# Patient Record
Sex: FEMALE | URBAN - METROPOLITAN AREA
[De-identification: names, ages, dates, MRNs, and addresses within clinical notes are randomized per-mention and may not be internally consistent; named-entity substitution may affect disease eponyms.]

---

## 2024-05-07 PROCEDURE — 0753T DGTZ GLS MCRSCP SLD LEVEL IV: CPT

## 2024-05-07 PROCEDURE — 88305 TISSUE EXAM BY PATHOLOGIST: CPT | Performed by: PATHOLOGY

## 2024-05-07 PROCEDURE — 88305 TISSUE EXAM BY PATHOLOGIST: CPT

## 2024-05-08 ENCOUNTER — LAB REQUISITION (OUTPATIENT)
Dept: LAB | Facility: HOSPITAL | Age: 69
End: 2024-05-08

## 2024-05-08 DIAGNOSIS — Z86.010 PERSONAL HISTORY OF COLONIC POLYPS: ICD-10-CM

## 2024-05-15 LAB
LABORATORY COMMENT REPORT: NORMAL
PATH REPORT.FINAL DX SPEC: NORMAL
PATH REPORT.GROSS SPEC: NORMAL
PATH REPORT.MICROSCOPIC SPEC OTHER STN: NORMAL
PATH REPORT.RELEVANT HX SPEC: NORMAL
PATH REPORT.TOTAL CANCER: NORMAL

## 2024-08-05 ENCOUNTER — APPOINTMENT (OUTPATIENT)
Dept: SURGERY | Facility: CLINIC | Age: 69
End: 2024-08-05
Payer: MEDICARE

## 2024-08-05 VITALS
HEIGHT: 68 IN | HEART RATE: 72 BPM | SYSTOLIC BLOOD PRESSURE: 154 MMHG | BODY MASS INDEX: 31.83 KG/M2 | WEIGHT: 210 LBS | DIASTOLIC BLOOD PRESSURE: 81 MMHG

## 2024-08-05 DIAGNOSIS — E27.8 ADRENAL MASS (MULTI): Primary | ICD-10-CM

## 2024-08-05 PROCEDURE — 99205 OFFICE O/P NEW HI 60 MIN: CPT | Performed by: SURGERY

## 2024-08-05 PROCEDURE — 1159F MED LIST DOCD IN RCRD: CPT | Performed by: SURGERY

## 2024-08-05 PROCEDURE — 1160F RVW MEDS BY RX/DR IN RCRD: CPT | Performed by: SURGERY

## 2024-08-05 PROCEDURE — 3008F BODY MASS INDEX DOCD: CPT | Performed by: SURGERY

## 2024-08-05 RX ORDER — TRIAMCINOLONE ACETONIDE 1 MG/ML
LOTION TOPICAL
COMMUNITY
Start: 2023-09-19

## 2024-08-05 RX ORDER — AMLODIPINE BESYLATE 5 MG/1
TABLET ORAL
COMMUNITY
Start: 2024-06-18

## 2024-08-05 RX ORDER — ALBUTEROL SULFATE 90 UG/1
INHALANT RESPIRATORY (INHALATION)
COMMUNITY
Start: 2024-03-05

## 2024-08-05 RX ORDER — VALSARTAN AND HYDROCHLOROTHIAZIDE 160; 25 MG/1; MG/1
TABLET ORAL
COMMUNITY
Start: 2024-06-18

## 2024-08-05 RX ORDER — BUDESONIDE AND FORMOTEROL FUMARATE DIHYDRATE 160; 4.5 UG/1; UG/1
AEROSOL RESPIRATORY (INHALATION)
COMMUNITY
Start: 2024-06-05

## 2024-08-05 RX ORDER — ERGOCALCIFEROL 1.25 MG/1
CAPSULE ORAL
COMMUNITY
Start: 2023-11-30

## 2024-08-05 RX ORDER — HYDROCORTISONE AND ACETIC ACID 20.75; 10.375 MG/ML; MG/ML
SOLUTION AURICULAR (OTIC)
COMMUNITY
Start: 2023-09-19

## 2024-08-05 RX ORDER — CLOTRIMAZOLE AND BETAMETHASONE DIPROPIONATE 10; .64 MG/G; MG/G
CREAM TOPICAL
COMMUNITY
Start: 2024-03-07

## 2024-08-05 RX ORDER — BETAMETHASONE DIPROPIONATE 0.5 MG/G
CREAM TOPICAL
COMMUNITY
Start: 2023-09-19

## 2024-08-05 ASSESSMENT — ENCOUNTER SYMPTOMS
MUSCULOSKELETAL NEGATIVE: 1
CARDIOVASCULAR NEGATIVE: 1
NEUROLOGICAL NEGATIVE: 1
PSYCHIATRIC NEGATIVE: 1
CONSTITUTIONAL NEGATIVE: 1
RESPIRATORY NEGATIVE: 1
EYES NEGATIVE: 1
ENDOCRINE NEGATIVE: 1

## 2024-08-05 NOTE — PROGRESS NOTES
Subjective   Patient ID: Noemí Reece is a 69 y.o. female who presents for surgical consultation for incidentally discovered adrenal mass.    HPI Mrs. Reece had a CT scan of the chest as a lung screening protocol.  During that study on the lower cuts she was found to have a 4.5 cm mass sitting just below the liver that they felt could be coming from the right adrenal gland or an exophytic liver lesion.  Therefore she was sent for an MRI of the abdomen on July 17, 2024.  This was done in the [facility].  This was read as a likely 6.3 cm right-sided adrenal mass.  These images are not available for my review today.  We will request that they get pushed over for further imaging.  If the MRI of his poor quality we may need to request a CT scan adrenal protocol to be done within the  system.  This may be necessary for accurate surgical planning.    Patient then met with Dr. Wheeler of medical endocrinology at [facility].  Dr. Wheeler did a biochemical workup on this.  24-hour catecholamines were negative for pheochromocytoma.  Urinary cortisol levels were normal.  Plasma cortisol was 8.8.  After dexamethasone suppression testing cortisol only dropped to 6.2.  ACTH level was low normal at 4.4.  Therefore the patient may have some subclinical Cushing's.  There is also mention in the note the patient was being sent for renin and aldosterone levels.  I saw a normal aldosterone level of 7.9 but I do not see a corresponding renin level.  However with that level of aldosterone it is less likely the patient has primary hyperaldosteronism.    She does have well-controlled hypertension on 2 drug therapy.    Patient stated that after the MRI was done at the [facility] that one of the radiologist reviewed old imaging on her and told her that she may have had an adrenal mass present for almost 20 years and it looks like it has been slowly growing over time.  She thinks some of these images may have been done at  [facility] which would be a [facility] affiliate.  Therefore we will also be requesting those images.  That could be very helpful to determine the history of this mass and the timeframe over which it grew.  I told her that slow-growing masses over time would be less likely to be something malignant.    She denies any real abdominal pain.  Perhaps some occasional right flank pain.  This is very intermittent.  No troubles with constipation no difficulty passing urine no blood in her urine or stool.  She said that over the last 3 years she has been trying to lose some weight and she lost about 20 pounds since she retired.  This was intentional.  Appetite remains good.    Family history-no adrenal disorders no adrenal cancer.  She said there is a history of colon cancer and breast cancer in her family.    Review of Systems   Constitutional: Negative.    HENT: Negative.     Eyes: Negative.    Respiratory: Negative.     Cardiovascular: Negative.    Endocrine: Negative.    Genitourinary: Negative.    Musculoskeletal: Negative.    Neurological: Negative.    Psychiatric/Behavioral: Negative.         Objective   Physical Exam  Vitals reviewed.   Constitutional:       Appearance: Normal appearance.   Eyes:      General: No scleral icterus.  Neck:      Vascular: No carotid bruit.      Comments: Thyroid gland feels normal.  No palpable masses  Cardiovascular:      Rate and Rhythm: Normal rate and regular rhythm.      Heart sounds: Normal heart sounds.   Pulmonary:      Effort: Pulmonary effort is normal.      Breath sounds: Normal breath sounds.   Abdominal:      General: Abdomen is flat. There is no distension.      Palpations: Abdomen is soft. There is no mass.      Tenderness: There is no right CVA tenderness or left CVA tenderness.   Musculoskeletal:         General: Normal range of motion.   Lymphadenopathy:      Cervical: No cervical adenopathy.   Skin:     General: Skin is warm and dry.      Coloration:  Skin is not jaundiced.   Neurological:      General: No focal deficit present.      Mental Status: She is alert and oriented to person, place, and time.   Psychiatric:         Mood and Affect: Mood normal.         Behavior: Behavior normal.         Assessment/Plan    Mrs. Reece had incidental discovery of an adrenal mass during a recent lung screening CT scan.  This was followed up by an MRI showing a 6.3 cm right adrenal mass.  Patient however told me that when the radiologist did some further review of old imaging on her that she was told that she had this adrenal mass dating back almost 20 years.  She thinks most of these may have been done with the [facility] system at [facility].  Therefore we will request some of her old imaging to see if we can get a timetable on how long the mass has been present and any sort of growth rate.    Masses not causing any abdominal pain for her.  No other functional symptoms.    She does have well-controlled hypertension.  She did not completely suppressed on a dexamethasone suppression test done by her endocrinologist.  Her workup was however negative for pheochromocytoma and for hyperaldosteronism.    I told her and her  that in general for adrenal masses greater than 6 cm we would recommend resection.  We will get the images of her MRI transferred to us so I can review those.  Will also try to get any old radiology records to look at a growth pattern for this.    Based on that, I told her we can then work on a timetable for surgery.  If it looks like this has been growing slowly and the MRI appears to have smooth borders, I would be willing to approach this laparoscopically for her.  If the MRI imaging is inadequate, we may request an adrenal protocol CT scan within the [place] system.  She said that she be willing to go to either [facility] or the [facility] to have the CT scan done.    I will call her as soon as  we get the MRI images for me to personally review.    She and her  are comfortable with this plan.         Cyrus Ervin MD 08/05/24 3:50 PM

## 2024-08-12 ENCOUNTER — DOCUMENTATION (OUTPATIENT)
Dept: SURGERY | Facility: CLINIC | Age: 69
End: 2024-08-12
Payer: MEDICARE

## 2024-08-12 NOTE — PROGRESS NOTES
Medical records request sent to [facility] for previous reports of CT/MRI abdomen/ pelvis. Written correspondence faxed back stating no records for these tests located.

## 2024-08-14 ENCOUNTER — TELEPHONE (OUTPATIENT)
Dept: SURGERY | Facility: CLINIC | Age: 69
End: 2024-08-14
Payer: MEDICARE

## 2024-08-14 DIAGNOSIS — E27.8 ADRENAL MASS (MULTI): ICD-10-CM

## 2024-08-14 NOTE — TELEPHONE ENCOUNTER
Call to patient. No answer. Left voicemail message notifying her that because the images from her MRI abd were inadequate, Dr. Ervin has ordered an adrenal CT and requests it be completed at a  facility. Patient instructed to go for creat lab prior to test per protocol. Patient also notified that a medical records request was sent to [facility] for previous CT/MRI imaging of the mass and no records were located. Patient asked to look through any old records available to her for dates/locations where we could continue to look for old imaging for comparison. Callback requested and number provided.

## 2024-08-19 ENCOUNTER — TELEPHONE (OUTPATIENT)
Dept: SURGERY | Facility: CLINIC | Age: 69
End: 2024-08-19
Payer: MEDICARE

## 2024-08-19 NOTE — TELEPHONE ENCOUNTER
Return call from patient who states that she no longer has any previous imaging reports available but is certain that the only previous CT scan done was of her shoulder. Patient denies previous CT/MRI of abdomen/pelvis/adrenals. Patient verifies she has appointment to get CT adrenal scan done 9/4/24. Dr. Ervin updated.

## 2024-08-26 ENCOUNTER — LAB (OUTPATIENT)
Dept: LAB | Facility: LAB | Age: 69
End: 2024-08-26
Payer: MEDICARE

## 2024-08-26 DIAGNOSIS — E27.8 ADRENAL MASS (MULTI): ICD-10-CM

## 2024-08-26 LAB
CREAT SERPL-MCNC: 0.9 MG/DL (ref 0.5–1.05)
EGFRCR SERPLBLD CKD-EPI 2021: 69 ML/MIN/1.73M*2

## 2024-08-26 PROCEDURE — 82565 ASSAY OF CREATININE: CPT

## 2024-08-26 PROCEDURE — 36415 COLL VENOUS BLD VENIPUNCTURE: CPT

## 2024-09-04 ENCOUNTER — HOSPITAL ENCOUNTER (OUTPATIENT)
Dept: RADIOLOGY | Facility: HOSPITAL | Age: 69
Discharge: HOME | End: 2024-09-04
Payer: MEDICARE

## 2024-09-04 DIAGNOSIS — E27.8 ADRENAL MASS (MULTI): ICD-10-CM

## 2024-09-04 PROCEDURE — 2550000001 HC RX 255 CONTRASTS: Performed by: SURGERY

## 2024-09-04 PROCEDURE — 74170 CT ABD WO CNTRST FLWD CNTRST: CPT | Performed by: STUDENT IN AN ORGANIZED HEALTH CARE EDUCATION/TRAINING PROGRAM

## 2024-09-04 PROCEDURE — 74170 CT ABD WO CNTRST FLWD CNTRST: CPT

## 2024-09-09 ENCOUNTER — TELEPHONE (OUTPATIENT)
Dept: SURGICAL ONCOLOGY | Facility: HOSPITAL | Age: 69
End: 2024-09-09
Payer: MEDICARE

## 2024-09-09 NOTE — TELEPHONE ENCOUNTER
Result Communication-patient that her imaging looks most consistent with a benign adrenal nodule.  It does look amenable to laparoscopic resection.    I will have my office staff contact her to select a surgical date in November or December that is convenient for her schedule.    Resulted Orders   CT adrenals w and wo IV contrast    Narrative    Interpreted By:  Piyush Kirk,   STUDY:  CT ADRENALS W AND WO IV CONTRAST;  9/4/2024 12:40 pm      INDICATION:  Signs/Symptoms:adrenal mass.      ,E27.8 Other specified disorders of adrenal gland (Multi)      COMPARISON:  Abdomen MRI 05/20/2024      ACCESSION NUMBER(S):  [id number]      ORDERING CLINICIAN:  BEE BONNER      TECHNIQUE:  CT of the abdomen was performed.  Contiguous axial images were  obtained at 3 mm slice thickness through the abdomen. Coronal and  sagittal reconstructions at 3 mm slice thickness were performed.   75  ML of Omnipaque 350 was administered intravenously without immediate  complication.      FINDINGS:  LOWER CHEST:  Bibasal atelectasis.      ABDOMEN:      LIVER:  Normal size. Normal contour. Normal density. Scattered simple cysts.      BILE DUCTS:  The bile ducts are not dilated.      GALLBLADDER:  Gallbladder: No calcified stones. No wall thickening.      PANCREAS:  The pancreas appears unremarkable.      SPLEEN:  Within normal limits.      ADRENAL GLANDS:  No left adrenal nodules.      Right adrenal nodule measuring 6.2 cm with the areas of macroscopic  fat in tiny foci of calcification. Precontrast average Hounsfield  unit of 8, enhanced CT Hounsfield unit number of 41 and delayed phase  Hounsfield unit number of 16 : Absolute washout: 76%  Relative washout : 61%      KIDNEYS AND URETERS:  The kidneys have normal size and enhance symmetrically.  No  hydroureteronephrosis or nephroureterolithiasis is present.      BOWEL:  No bowel dilatation. Mild colonic stool burden. No ascites or  pneumoperitoneum.      VESSELS:  Multifocal vascular  calcifications and atherosclerotic changes      PERITONEUM/RETROPERITONEUM/LYMPH NODES:  No enlarged lymph nodes      ABDOMINAL WALL:  The abdominal wall soft tissues appear normal.      BONES:  Multilevel degenerative spine changes and facet joint disease.  Benign-looking sclerotic focus in L4 vertebral body.        Impression    1. Right adrenal nodule measuring 6.2 cm with absolute and relative  washout favoring adenoma, alternatively there are areas of possible  macroscopic fat which also could be seen with myelolipoma. Few flecks  of calcifications noted which could be seen in myelolipoma.  Alternative and remote possibility of rare dermoid cyst of the  adrenal gland given morphology. Old films if available for  comparison, would be of great value for documenting stability and  predicting benign nature of lesion. To note, given the size of this  lesion, this observation would be indeterminate and warrant further  workup as adrenocortical carcinoma (although felt less likely) could  not be entirely excluded without comparison.  2. Few scattered simple hepatic cysts.      MACRO:  None      Signed by: Piyush Kirk 9/6/2024 1:25 PM  Dictation workstation:   [id number]       12:05 PM      Results were successfully communicated with the patient and they acknowledged their understanding.

## 2024-11-06 DIAGNOSIS — R23.3 EASY BRUISING: ICD-10-CM

## 2024-11-06 DIAGNOSIS — Z00.00 HEALTHCARE MAINTENANCE: ICD-10-CM

## 2024-11-06 DIAGNOSIS — Z01.818 PREOPERATIVE TESTING: ICD-10-CM

## 2024-11-06 DIAGNOSIS — E27.8 ADRENAL MASS (MULTI): ICD-10-CM

## 2024-11-15 ENCOUNTER — PREP FOR PROCEDURE (OUTPATIENT)
Dept: SURGICAL ONCOLOGY | Facility: HOSPITAL | Age: 69
End: 2024-11-15
Payer: MEDICARE

## 2024-11-15 DIAGNOSIS — E27.8 ADRENAL MASS GREATER THAN 4 CM IN DIAMETER (MULTI): Primary | ICD-10-CM

## 2024-11-15 NOTE — H&P
Noemí Reece is a 69 y.o. female who  had a CT scan of the chest as a lung screening protocol.  During that study on the lower cuts she was found to have a 4.5 cm mass sitting just below the liver that they felt could be coming from the right adrenal gland or an exophytic liver lesion.  Therefore she was sent for an MRI of the abdomen on July 17, 2024.  This was done in the [facility] system.  This was read as a likely 6.3 cm right-sided adrenal mass.  These images are not available for my review today.  We will request that they get pushed over for further imaging.  If the MRI of his poor quality we may need to request a CT scan adrenal protocol to be done within the  system.  This may be necessary for accurate surgical planning.     Patient then met with Dr. Wheeler of medical endocrinology at [facility].  Dr. Wheeler did a biochemical workup on this.  24-hour catecholamines were negative for pheochromocytoma.  Urinary cortisol levels were normal.  Plasma cortisol was 8.8.  After dexamethasone suppression testing cortisol only dropped to 6.2.  ACTH level was low normal at 4.4.  Therefore the patient may have some subclinical Cushing's.  There is also mention in the note the patient was being sent for renin and aldosterone levels.  I saw a normal aldosterone level of 7.9 but I do not see a corresponding renin level.  However with that level of aldosterone it is less likely the patient has primary hyperaldosteronism.     She does have well-controlled hypertension on 2 drug therapy.     Patient stated that after the MRI was done at the [facility] that one of the radiologist reviewed old imaging on her and told her that she may have had an adrenal mass present for almost 20 years and it looks like it has been slowly growing over time.  She thinks some of these images may have been done at [facility] which would be a [facility] affiliate.  Therefore we will also be requesting those images.   That could be very helpful to determine the history of this mass and the timeframe over which it grew.  I told her that slow-growing masses over time would be less likely to be something malignant.     She denies any real abdominal pain.  Perhaps some occasional right flank pain.  This is very intermittent.  No troubles with constipation no difficulty passing urine no blood in her urine or stool.  She said that over the last 3 years she has been trying to lose some weight and she lost about 20 pounds since she retired.  This was intentional.  Appetite remains good.     Family history-no adrenal disorders no adrenal cancer.  She said there is a history of colon cancer and breast cancer in her family.     Review of Systems   Constitutional: Negative.    HENT: Negative.     Eyes: Negative.    Respiratory: Negative.     Cardiovascular: Negative.    Endocrine: Negative.    Genitourinary: Negative.    Musculoskeletal: Negative.    Neurological: Negative.    Psychiatric/Behavioral: Negative.              Objective  Physical Exam  Vitals reviewed.   Constitutional:       Appearance: Normal appearance.   Eyes:      General: No scleral icterus.  Neck:      Vascular: No carotid bruit.      Comments: Thyroid gland feels normal.  No palpable masses  Cardiovascular:      Rate and Rhythm: Normal rate and regular rhythm.      Heart sounds: Normal heart sounds.   Pulmonary:      Effort: Pulmonary effort is normal.      Breath sounds: Normal breath sounds.   Abdominal:      General: Abdomen is flat. There is no distension.      Palpations: Abdomen is soft. There is no mass.      Tenderness: There is no right CVA tenderness or left CVA tenderness.   Musculoskeletal:         General: Normal range of motion.   Lymphadenopathy:      Cervical: No cervical adenopathy.   Skin:     General: Skin is warm and dry.      Coloration: Skin is not jaundiced.   Neurological:      General: No focal deficit present.      Mental Status: She is alert  and oriented to person, place, and time.   Psychiatric:         Mood and Affect: Mood normal.         Behavior: Behavior normal.         COMPARISON:  Abdomen MRI 05/20/2024      ACCESSION NUMBER(S):  [id number]      ORDERING CLINICIAN:  BEE BONNER      TECHNIQUE:  CT of the abdomen was performed.  Contiguous axial images were  obtained at 3 mm slice thickness through the abdomen. Coronal and  sagittal reconstructions at 3 mm slice thickness were performed.   75  ML of Omnipaque 350 was administered intravenously without immediate  complication.      FINDINGS:  LOWER CHEST:  Bibasal atelectasis.      ABDOMEN:      LIVER:  Normal size. Normal contour. Normal density. Scattered simple cysts.      BILE DUCTS:  The bile ducts are not dilated.      GALLBLADDER:  Gallbladder: No calcified stones. No wall thickening.      PANCREAS:  The pancreas appears unremarkable.      SPLEEN:  Within normal limits.      ADRENAL GLANDS:  No left adrenal nodules.      Right adrenal nodule measuring 6.2 cm with the areas of macroscopic  fat in tiny foci of calcification. Precontrast average Hounsfield  unit of 8, enhanced CT Hounsfield unit number of 41 and delayed phase  Hounsfield unit number of 16 : Absolute washout: 76%  Relative washout : 61%      KIDNEYS AND URETERS:  The kidneys have normal size and enhance symmetrically.  No  hydroureteronephrosis or nephroureterolithiasis is present.      BOWEL:  No bowel dilatation. Mild colonic stool burden. No ascites or  pneumoperitoneum.      VESSELS:  Multifocal vascular calcifications and atherosclerotic changes      PERITONEUM/RETROPERITONEUM/LYMPH NODES:  No enlarged lymph nodes      ABDOMINAL WALL:  The abdominal wall soft tissues appear normal.      BONES:  Multilevel degenerative spine changes and facet joint disease.  Benign-looking sclerotic focus in L4 vertebral body.      IMPRESSION:  1. Right adrenal nodule measuring 6.2 cm with absolute and relative  washout favoring  adenoma, alternatively there are areas of possible  macroscopic fat which also could be seen with myelolipoma. Few flecks  of calcifications noted which could be seen in myelolipoma.  Alternative and remote possibility of rare dermoid cyst of the  adrenal gland given morphology. Old films if available for  comparison, would be of great value for documenting stability and  predicting benign nature of lesion. To note, given the size of this  lesion, this observation would be indeterminate and warrant further  workup as adrenocortical carcinoma (although felt less likely) could  not be entirely excluded without comparison.  2. Few scattered simple hepatic cysts.            Assessment/Plan  Mrs. Reece had incidental discovery of an adrenal mass during a recent lung screening CT scan.  This was followed up by an MRI showing a 6.3 cm right adrenal mass.  Patient however told me that when the radiologist did some further review of old imaging on her that she was told that she had this adrenal mass dating back almost 20 years.  She thinks most of these may have been done with the [facility] system at [facility].  Therefore we will request some of her old imaging to see if we can get a timetable on how long the mass has been present and any sort of growth rate.     Masses not causing any abdominal pain for her.  No other functional symptoms.     She does have well-controlled hypertension.  She did not completely suppressed on a dexamethasone suppression test done by her endocrinologist.  Her workup was however negative for pheochromocytoma and for hyperaldosteronism.    Plan    I will get her scheduled for a right-sided laparoscopic adrenalectomy at [facility] on December 4, 2024.

## 2024-11-18 ENCOUNTER — HOSPITAL ENCOUNTER (OUTPATIENT)
Facility: HOSPITAL | Age: 69
Setting detail: SURGERY ADMIT
End: 2024-11-18
Attending: SURGERY | Admitting: SURGERY
Payer: MEDICARE

## 2024-11-18 PROBLEM — E27.8: Status: ACTIVE | Noted: 2024-11-15

## 2024-11-21 ENCOUNTER — TELEPHONE (OUTPATIENT)
Dept: SURGERY | Facility: CLINIC | Age: 69
End: 2024-11-21
Payer: MEDICARE

## 2024-11-21 NOTE — TELEPHONE ENCOUNTER
Called patient to confirm OR date 12/4/24. Notified patient she needs to complete labs and an ECG before the date of surgery. Orders in EMR.  Patient would like to receive pre-op instructions via email and address confirmed. All questions addressed. Callback number provided.

## 2024-11-25 ENCOUNTER — LAB (OUTPATIENT)
Dept: LAB | Facility: LAB | Age: 69
End: 2024-11-25
Payer: MEDICARE

## 2024-11-25 ENCOUNTER — HOSPITAL ENCOUNTER (OUTPATIENT)
Dept: CARDIOLOGY | Facility: CLINIC | Age: 69
Discharge: HOME | End: 2024-11-25
Payer: MEDICARE

## 2024-11-25 DIAGNOSIS — R23.3 EASY BRUISING: ICD-10-CM

## 2024-11-25 DIAGNOSIS — E27.8 ADRENAL MASS (MULTI): ICD-10-CM

## 2024-11-25 DIAGNOSIS — Z00.00 HEALTHCARE MAINTENANCE: ICD-10-CM

## 2024-11-25 DIAGNOSIS — Z01.818 PREOPERATIVE TESTING: ICD-10-CM

## 2024-11-25 LAB
ALBUMIN SERPL BCP-MCNC: 4.5 G/DL (ref 3.4–5)
ALP SERPL-CCNC: 66 U/L (ref 33–136)
ALT SERPL W P-5'-P-CCNC: 17 U/L (ref 7–45)
ANION GAP SERPL CALC-SCNC: 17 MMOL/L (ref 10–20)
APTT PPP: 34 SECONDS (ref 27–38)
AST SERPL W P-5'-P-CCNC: 14 U/L (ref 9–39)
BILIRUB SERPL-MCNC: 0.4 MG/DL (ref 0–1.2)
BUN SERPL-MCNC: 17 MG/DL (ref 6–23)
CALCIUM SERPL-MCNC: 9.3 MG/DL (ref 8.6–10.6)
CHLORIDE SERPL-SCNC: 103 MMOL/L (ref 98–107)
CO2 SERPL-SCNC: 25 MMOL/L (ref 21–32)
CREAT SERPL-MCNC: 0.96 MG/DL (ref 0.5–1.05)
EGFRCR SERPLBLD CKD-EPI 2021: 64 ML/MIN/1.73M*2
ERYTHROCYTE [DISTWIDTH] IN BLOOD BY AUTOMATED COUNT: 13.1 % (ref 11.5–14.5)
GLUCOSE SERPL-MCNC: 85 MG/DL (ref 74–99)
HCT VFR BLD AUTO: 45.8 % (ref 36–46)
HGB BLD-MCNC: 15 G/DL (ref 12–16)
INR PPP: 1.2 (ref 0.9–1.1)
MCH RBC QN AUTO: 30.2 PG (ref 26–34)
MCHC RBC AUTO-ENTMCNC: 32.8 G/DL (ref 32–36)
MCV RBC AUTO: 92 FL (ref 80–100)
NRBC BLD-RTO: 0 /100 WBCS (ref 0–0)
PLATELET # BLD AUTO: 219 X10*3/UL (ref 150–450)
POTASSIUM SERPL-SCNC: 3.8 MMOL/L (ref 3.5–5.3)
PROT SERPL-MCNC: 7 G/DL (ref 6.4–8.2)
PROTHROMBIN TIME: 13.1 SECONDS (ref 9.8–12.8)
RBC # BLD AUTO: 4.96 X10*6/UL (ref 4–5.2)
SODIUM SERPL-SCNC: 141 MMOL/L (ref 136–145)
WBC # BLD AUTO: 14 X10*3/UL (ref 4.4–11.3)

## 2024-11-25 PROCEDURE — 85027 COMPLETE CBC AUTOMATED: CPT

## 2024-11-25 PROCEDURE — 93005 ELECTROCARDIOGRAM TRACING: CPT

## 2024-11-25 PROCEDURE — 85610 PROTHROMBIN TIME: CPT

## 2024-11-25 PROCEDURE — 85730 THROMBOPLASTIN TIME PARTIAL: CPT

## 2024-11-25 PROCEDURE — 36415 COLL VENOUS BLD VENIPUNCTURE: CPT

## 2024-11-25 PROCEDURE — 80053 COMPREHEN METABOLIC PANEL: CPT

## 2024-11-26 LAB
ATRIAL RATE: 99 BPM
P AXIS: 64 DEGREES
P OFFSET: 202 MS
P ONSET: 155 MS
PR INTERVAL: 142 MS
Q ONSET: 226 MS
QRS COUNT: 16 BEATS
QRS DURATION: 64 MS
QT INTERVAL: 308 MS
QTC CALCULATION(BAZETT): 395 MS
QTC FREDERICIA: 363 MS
R AXIS: 39 DEGREES
T AXIS: 55 DEGREES
T OFFSET: 380 MS
VENTRICULAR RATE: 99 BPM

## 2024-12-16 ENCOUNTER — APPOINTMENT (OUTPATIENT)
Dept: SURGERY | Facility: CLINIC | Age: 69
End: 2024-12-16
Payer: MEDICARE

## 2024-12-23 ENCOUNTER — PREP FOR PROCEDURE (OUTPATIENT)
Dept: SURGICAL ONCOLOGY | Facility: HOSPITAL | Age: 69
End: 2024-12-23
Payer: MEDICARE

## 2024-12-23 DIAGNOSIS — E27.8 RIGHT ADRENAL MASS (MULTI): Primary | ICD-10-CM

## 2024-12-23 NOTE — H&P
Noemí Reece is a 69 y.o. female who had a CT scan of the chest as a lung screening protocol.  During that study on the lower cuts she was found to have a 4.5 cm mass sitting just below the liver that they felt could be coming from the right adrenal gland or an exophytic liver lesion.  Therefore she was sent for an MRI of the abdomen on July 17, 2024.  This was done in the [facility] system.  This was read as a likely 6.3 cm right-sided adrenal mass.  These images are not available for my review today.  We will request that they get pushed over for further imaging.  If the MRI of his poor quality we may need to request a CT scan adrenal protocol to be done within the  system.  This may be necessary for accurate surgical planning.     Patient then met with Dr. Wheeler of medical endocrinology at [facility].  Dr. Wheeler did a biochemical workup on this.  24-hour catecholamines were negative for pheochromocytoma.  Urinary cortisol levels were normal.  Plasma cortisol was 8.8.  After dexamethasone suppression testing cortisol only dropped to 6.2.  ACTH level was low normal at 4.4.  Therefore the patient may have some subclinical Cushing's.  There is also mention in the note the patient was being sent for renin and aldosterone levels.  I saw a normal aldosterone level of 7.9 but I do not see a corresponding renin level.  However with that level of aldosterone it is less likely the patient has primary hyperaldosteronism.     She does have well-controlled hypertension on 2 drug therapy.     Patient stated that after the MRI was done at the [facility] that one of the radiologist reviewed old imaging on her and told her that she may have had an adrenal mass present for almost 20 years and it looks like it has been slowly growing over time.  She thinks some of these images may have been done at [facility] which would be a [facility] affiliate.  Therefore we will also be requesting those images.   That could be very helpful to determine the history of this mass and the timeframe over which it grew.  I told her that slow-growing masses over time would be less likely to be something malignant.     She denies any real abdominal pain.  Perhaps some occasional right flank pain.  This is very intermittent.  No troubles with constipation no difficulty passing urine no blood in her urine or stool.  She said that over the last 3 years she has been trying to lose some weight and she lost about 20 pounds since she retired.  This was intentional.  Appetite remains good.     Family history-no adrenal disorders no adrenal cancer.  She said there is a history of colon cancer and breast cancer in her family.     Review of Systems   Constitutional: Negative.    HENT: Negative.     Eyes: Negative.    Respiratory: Negative.     Cardiovascular: Negative.    Endocrine: Negative.    Genitourinary: Negative.    Musculoskeletal: Negative.    Neurological: Negative.    Psychiatric/Behavioral: Negative.              Objective  Physical Exam  Vitals reviewed.   Constitutional:       Appearance: Normal appearance.   Eyes:      General: No scleral icterus.  Neck:      Vascular: No carotid bruit.      Comments: Thyroid gland feels normal.  No palpable masses  Cardiovascular:      Rate and Rhythm: Normal rate and regular rhythm.      Heart sounds: Normal heart sounds.   Pulmonary:      Effort: Pulmonary effort is normal.      Breath sounds: Normal breath sounds.   Abdominal:      General: Abdomen is flat. There is no distension.      Palpations: Abdomen is soft. There is no mass.      Tenderness: There is no right CVA tenderness or left CVA tenderness.   Musculoskeletal:         General: Normal range of motion.   Lymphadenopathy:      Cervical: No cervical adenopathy.   Skin:     General: Skin is warm and dry.      Coloration: Skin is not jaundiced.   Neurological:      General: No focal deficit present.      Mental Status: She is alert  and oriented to person, place, and time.   Psychiatric:         Mood and Affect: Mood normal.         Behavior: Behavior normal.               Assessment/Plan  Mrs. Reece had incidental discovery of an adrenal mass during a recent lung screening CT scan.  This was followed up by an MRI showing a 6.3 cm right adrenal mass.  Patient however told me that when the radiologist did some further review of old imaging on her that she was told that she had this adrenal mass dating back almost 20 years.  She thinks most of these may have been done with the [facility] system at [facility].  Therefore we will request some of her old imaging to see if we can get a timetable on how long the mass has been present and any sort of growth rate.     Masses not causing any abdominal pain for her.  No other functional symptoms.     She does have well-controlled hypertension.  She did not completely suppressed on a dexamethasone suppression test done by her endocrinologist.  Her workup was however negative for pheochromocytoma and for hyperaldosteronism.     I told her and her  that in general for adrenal masses greater than 6 cm we would recommend resection.  We will get the images of her MRI transferred to us so I can review those.  Will also try to get any old radiology records to look at a growth pattern for this.    Result Communication-patient that her imaging looks most consistent with a benign adrenal nodule.  It does look amenable to laparoscopic resection.     I will have my office staff contact her to select a surgical date in November or December that is convenient for her schedule.         Resulted Orders   CT adrenals w and wo IV contrast     Narrative     Interpreted By:  Piyush Kirk,   STUDY:  CT ADRENALS W AND WO IV CONTRAST;  9/4/2024 12:40 pm      INDICATION:  Signs/Symptoms:adrenal mass.      ,E27.8 Other specified disorders of adrenal gland (Multi)      COMPARISON:  Abdomen MRI 05/20/2024       ACCESSION NUMBER(S):  [id number]      ORDERING CLINICIAN:  BEE BONNER      TECHNIQUE:  CT of the abdomen was performed.  Contiguous axial images were  obtained at 3 mm slice thickness through the abdomen. Coronal and  sagittal reconstructions at 3 mm slice thickness were performed.   75  ML of Omnipaque 350 was administered intravenously without immediate  complication.      FINDINGS:  LOWER CHEST:  Bibasal atelectasis.      ABDOMEN:      LIVER:  Normal size. Normal contour. Normal density. Scattered simple cysts.      BILE DUCTS:  The bile ducts are not dilated.      GALLBLADDER:  Gallbladder: No calcified stones. No wall thickening.      PANCREAS:  The pancreas appears unremarkable.      SPLEEN:  Within normal limits.      ADRENAL GLANDS:  No left adrenal nodules.      Right adrenal nodule measuring 6.2 cm with the areas of macroscopic  fat in tiny foci of calcification. Precontrast average Hounsfield  unit of 8, enhanced CT Hounsfield unit number of 41 and delayed phase  Hounsfield unit number of 16 : Absolute washout: 76%  Relative washout : 61%      KIDNEYS AND URETERS:  The kidneys have normal size and enhance symmetrically.  No  hydroureteronephrosis or nephroureterolithiasis is present.      BOWEL:  No bowel dilatation. Mild colonic stool burden. No ascites or  pneumoperitoneum.      VESSELS:  Multifocal vascular calcifications and atherosclerotic changes      PERITONEUM/RETROPERITONEUM/LYMPH NODES:  No enlarged lymph nodes      ABDOMINAL WALL:  The abdominal wall soft tissues appear normal.      BONES:  Multilevel degenerative spine changes and facet joint disease.  Benign-looking sclerotic focus in L4 vertebral body.         Impression     1. Right adrenal nodule measuring 6.2 cm with absolute and relative  washout favoring adenoma, alternatively there are areas of possible  macroscopic fat which also could be seen with myelolipoma. Few flecks  of calcifications noted which could be seen in  myelolipoma.  Alternative and remote possibility of rare dermoid cyst of the  adrenal gland given morphology. Old films if available for  comparison, would be of great value for documenting stability and  predicting benign nature of lesion. To note, given the size of this  lesion, this observation would be indeterminate and warrant further  workup as adrenocortical carcinoma (although felt less likely) could  not be entirely excluded without comparison.  2. Few scattered simple hepatic cysts.      MACRO:  None      Signed by: Piyush Kirk 9/6/2024 1:25 PM  Dictation workstation:   [id number]     Plan    I am scheduling her for a right laparoscopic adrenalectomy at [facility] on Tuesday, January 28, 2025.  My office staff will contact her the day before surgery with what time to arrive.  2.   My nurse will send her one of our preoperative packets and we can work on information regarding her bowel prep as well as preoperative laboratory testing.

## 2024-12-30 PROBLEM — E27.8 RIGHT ADRENAL MASS (MULTI): Status: ACTIVE | Noted: 2024-12-23

## 2025-01-06 DIAGNOSIS — E27.8 RIGHT ADRENAL MASS (MULTI): ICD-10-CM

## 2025-01-06 DIAGNOSIS — Z00.00 HEALTHCARE MAINTENANCE: ICD-10-CM

## 2025-01-06 DIAGNOSIS — Z01.818 PREOPERATIVE TESTING: ICD-10-CM

## 2025-01-06 DIAGNOSIS — R23.3 EASY BRUISING: ICD-10-CM

## 2025-01-23 ENCOUNTER — TELEPHONE (OUTPATIENT)
Dept: SURGERY | Facility: CLINIC | Age: 70
End: 2025-01-23
Payer: MEDICARE

## 2025-01-23 NOTE — TELEPHONE ENCOUNTER
Called patient to confirm OR date 1/28/25. Notified patient she needs to complete labs before the date of surgery. Orders in EMR and reqs emailed to patient.  Patient states she still has original copy of pre-op instructions. Reviewed bowel prep and clear liquid diet. All questions addressed. Callback number provided.

## 2025-01-24 ENCOUNTER — LAB (OUTPATIENT)
Dept: LAB | Facility: LAB | Age: 70
End: 2025-01-24
Payer: MEDICARE

## 2025-01-24 DIAGNOSIS — R23.3 EASY BRUISING: ICD-10-CM

## 2025-01-24 DIAGNOSIS — Z00.00 HEALTHCARE MAINTENANCE: ICD-10-CM

## 2025-01-24 DIAGNOSIS — E27.8 RIGHT ADRENAL MASS (MULTI): ICD-10-CM

## 2025-01-24 DIAGNOSIS — Z01.818 PREOPERATIVE TESTING: ICD-10-CM

## 2025-01-24 LAB
ALBUMIN SERPL BCP-MCNC: 4.3 G/DL (ref 3.4–5)
ALP SERPL-CCNC: 61 U/L (ref 33–136)
ALT SERPL W P-5'-P-CCNC: 19 U/L (ref 7–45)
ANION GAP SERPL CALC-SCNC: 10 MMOL/L (ref 10–20)
APTT PPP: 30 SECONDS (ref 27–38)
AST SERPL W P-5'-P-CCNC: 16 U/L (ref 9–39)
BILIRUB SERPL-MCNC: 0.4 MG/DL (ref 0–1.2)
BUN SERPL-MCNC: 19 MG/DL (ref 6–23)
CALCIUM SERPL-MCNC: 9 MG/DL (ref 8.6–10.3)
CHLORIDE SERPL-SCNC: 106 MMOL/L (ref 98–107)
CO2 SERPL-SCNC: 27 MMOL/L (ref 21–32)
CREAT SERPL-MCNC: 0.86 MG/DL (ref 0.5–1.05)
EGFRCR SERPLBLD CKD-EPI 2021: 72 ML/MIN/1.73M*2
ERYTHROCYTE [DISTWIDTH] IN BLOOD BY AUTOMATED COUNT: 13.2 % (ref 11.5–14.5)
GLUCOSE SERPL-MCNC: 81 MG/DL (ref 74–99)
HCT VFR BLD AUTO: 46.1 % (ref 36–46)
HGB BLD-MCNC: 14.6 G/DL (ref 12–16)
INR PPP: 1 (ref 0.9–1.1)
MCH RBC QN AUTO: 29.8 PG (ref 26–34)
MCHC RBC AUTO-ENTMCNC: 31.7 G/DL (ref 32–36)
MCV RBC AUTO: 94 FL (ref 80–100)
NRBC BLD-RTO: 0 /100 WBCS (ref 0–0)
PLATELET # BLD AUTO: 202 X10*3/UL (ref 150–450)
POTASSIUM SERPL-SCNC: 3.6 MMOL/L (ref 3.5–5.3)
PROT SERPL-MCNC: 6.8 G/DL (ref 6.4–8.2)
PROTHROMBIN TIME: 11.5 SECONDS (ref 9.8–12.8)
RBC # BLD AUTO: 4.9 X10*6/UL (ref 4–5.2)
SODIUM SERPL-SCNC: 139 MMOL/L (ref 136–145)
WBC # BLD AUTO: 9.5 X10*3/UL (ref 4.4–11.3)

## 2025-01-24 PROCEDURE — 85027 COMPLETE CBC AUTOMATED: CPT

## 2025-01-24 PROCEDURE — 85730 THROMBOPLASTIN TIME PARTIAL: CPT

## 2025-01-24 PROCEDURE — 80053 COMPREHEN METABOLIC PANEL: CPT

## 2025-01-24 PROCEDURE — 85610 PROTHROMBIN TIME: CPT

## 2025-01-27 ENCOUNTER — ANESTHESIA EVENT (OUTPATIENT)
Dept: OPERATING ROOM | Facility: HOSPITAL | Age: 70
End: 2025-01-27
Payer: MEDICARE

## 2025-01-27 NOTE — PROGRESS NOTES
Pharmacy Medication History Review    Noemí Reece is a 70 y.o. female who is planned to be admitted for Right adrenal mass (Multi). Pharmacy called the patient prior to their scheduled procedure and reviewed the patient's [id number] medications for accuracy.    Medications ADDED:  none  Medications CHANGED:  none  Medications REMOVED:   Ergocalciferol 50,000U  Vosol-HC  Triamcinolone lotion    Please review updated prior to admission medication list and comments regarding how patient may be taking medications differently by going to Admission tab --> Admission Orders --> Admit Orders / Review prior to admission medications.     Preferred pharmacy, last doses of medications, and allergies to be confirmed with patient by nursing the day of procedure.     Sources used to complete the med history include:  [facility]  Pharmacy dispense history  Patient interview  Chart Review  Care Everywhere     Below are additional concerns with the patient's PTA list.  Patient states they only use albuterol inhaler MALVINN      Shoshana Pierce CPhT  Please reach out via Secure Chat for questions or call [facility] or [facility]

## 2025-01-28 ENCOUNTER — HOSPITAL ENCOUNTER (INPATIENT)
Facility: HOSPITAL | Age: 70
LOS: 1 days | Discharge: HOME | End: 2025-01-29
Attending: SURGERY | Admitting: SURGERY
Payer: MEDICARE

## 2025-01-28 ENCOUNTER — ANESTHESIA (OUTPATIENT)
Dept: OPERATING ROOM | Facility: HOSPITAL | Age: 70
End: 2025-01-28
Payer: MEDICARE

## 2025-01-28 DIAGNOSIS — E27.8 RIGHT ADRENAL MASS (MULTI): Primary | ICD-10-CM

## 2025-01-28 PROBLEM — I10 HTN (HYPERTENSION): Status: ACTIVE | Noted: 2025-01-28

## 2025-01-28 LAB
ABO GROUP (TYPE) IN BLOOD: NORMAL
ANTIBODY SCREEN: NORMAL
CORTIS SERPL-MCNC: 23.7 UG/DL (ref 2.5–20)
CORTIS SERPL-MCNC: 6.2 UG/DL (ref 2.5–20)
RH FACTOR (ANTIGEN D): NORMAL

## 2025-01-28 PROCEDURE — 3600000004 HC OR TIME - INITIAL BASE CHARGE - PROCEDURE LEVEL FOUR: Performed by: SURGERY

## 2025-01-28 PROCEDURE — 80400 ACTH STIMULATION PANEL: CPT | Performed by: STUDENT IN AN ORGANIZED HEALTH CARE EDUCATION/TRAINING PROGRAM

## 2025-01-28 PROCEDURE — 82533 TOTAL CORTISOL: CPT | Performed by: STUDENT IN AN ORGANIZED HEALTH CARE EDUCATION/TRAINING PROGRAM

## 2025-01-28 PROCEDURE — 88307 TISSUE EXAM BY PATHOLOGIST: CPT | Performed by: PATHOLOGY

## 2025-01-28 PROCEDURE — 2500000004 HC RX 250 GENERAL PHARMACY W/ HCPCS (ALT 636 FOR OP/ED): Performed by: ANESTHESIOLOGY

## 2025-01-28 PROCEDURE — 3600000009 HC OR TIME - EACH INCREMENTAL 1 MINUTE - PROCEDURE LEVEL FOUR: Performed by: SURGERY

## 2025-01-28 PROCEDURE — 7100000001 HC RECOVERY ROOM TIME - INITIAL BASE CHARGE: Performed by: SURGERY

## 2025-01-28 PROCEDURE — 0GT34ZZ RESECTION OF RIGHT ADRENAL GLAND, PERCUTANEOUS ENDOSCOPIC APPROACH: ICD-10-PCS | Performed by: SURGERY

## 2025-01-28 PROCEDURE — 2500000001 HC RX 250 WO HCPCS SELF ADMINISTERED DRUGS (ALT 637 FOR MEDICARE OP): Performed by: STUDENT IN AN ORGANIZED HEALTH CARE EDUCATION/TRAINING PROGRAM

## 2025-01-28 PROCEDURE — 86901 BLOOD TYPING SEROLOGIC RH(D): CPT | Performed by: SURGERY

## 2025-01-28 PROCEDURE — 36415 COLL VENOUS BLD VENIPUNCTURE: CPT | Performed by: STUDENT IN AN ORGANIZED HEALTH CARE EDUCATION/TRAINING PROGRAM

## 2025-01-28 PROCEDURE — 2720000007 HC OR 272 NO HCPCS: Performed by: SURGERY

## 2025-01-28 PROCEDURE — A60650 PR LAP,ADRENALECTOMY: Performed by: NURSE ANESTHETIST, CERTIFIED REGISTERED

## 2025-01-28 PROCEDURE — 36415 COLL VENOUS BLD VENIPUNCTURE: CPT | Performed by: SURGERY

## 2025-01-28 PROCEDURE — 37799 UNLISTED PX VASCULAR SURGERY: CPT | Performed by: STUDENT IN AN ORGANIZED HEALTH CARE EDUCATION/TRAINING PROGRAM

## 2025-01-28 PROCEDURE — A60650 PR LAP,ADRENALECTOMY: Performed by: ANESTHESIOLOGY

## 2025-01-28 PROCEDURE — 2500000004 HC RX 250 GENERAL PHARMACY W/ HCPCS (ALT 636 FOR OP/ED): Performed by: STUDENT IN AN ORGANIZED HEALTH CARE EDUCATION/TRAINING PROGRAM

## 2025-01-28 PROCEDURE — 36620 INSERTION CATHETER ARTERY: CPT | Performed by: NURSE ANESTHETIST, CERTIFIED REGISTERED

## 2025-01-28 PROCEDURE — 2500000001 HC RX 250 WO HCPCS SELF ADMINISTERED DRUGS (ALT 637 FOR MEDICARE OP)

## 2025-01-28 PROCEDURE — 88307 TISSUE EXAM BY PATHOLOGIST: CPT | Mod: TC,SUR,WESLAB | Performed by: SURGERY

## 2025-01-28 PROCEDURE — 60650 LAPAROSCOPY ADRENALECTOMY: CPT | Performed by: SURGERY

## 2025-01-28 PROCEDURE — 3700000002 HC GENERAL ANESTHESIA TIME - EACH INCREMENTAL 1 MINUTE: Performed by: SURGERY

## 2025-01-28 PROCEDURE — 2500000004 HC RX 250 GENERAL PHARMACY W/ HCPCS (ALT 636 FOR OP/ED): Performed by: NURSE ANESTHETIST, CERTIFIED REGISTERED

## 2025-01-28 PROCEDURE — 1170000001 HC PRIVATE ONCOLOGY ROOM DAILY

## 2025-01-28 PROCEDURE — 7100000002 HC RECOVERY ROOM TIME - EACH INCREMENTAL 1 MINUTE: Performed by: SURGERY

## 2025-01-28 PROCEDURE — 2500000005 HC RX 250 GENERAL PHARMACY W/O HCPCS: Performed by: SURGERY

## 2025-01-28 PROCEDURE — 99100 ANES PT EXTEME AGE<1 YR&>70: CPT | Performed by: ANESTHESIOLOGY

## 2025-01-28 PROCEDURE — 3700000001 HC GENERAL ANESTHESIA TIME - INITIAL BASE CHARGE: Performed by: SURGERY

## 2025-01-28 PROCEDURE — 2500000004 HC RX 250 GENERAL PHARMACY W/ HCPCS (ALT 636 FOR OP/ED): Performed by: SURGERY

## 2025-01-28 RX ORDER — ROCURONIUM BROMIDE 10 MG/ML
INJECTION, SOLUTION INTRAVENOUS AS NEEDED
Status: DISCONTINUED | OUTPATIENT
Start: 2025-01-28 | End: 2025-01-28

## 2025-01-28 RX ORDER — ENOXAPARIN SODIUM 100 MG/ML
40 INJECTION SUBCUTANEOUS EVERY 24 HOURS
Status: DISCONTINUED | OUTPATIENT
Start: 2025-01-28 | End: 2025-01-29

## 2025-01-28 RX ORDER — LIDOCAINE HCL/PF 100 MG/5ML
SYRINGE (ML) INTRAVENOUS AS NEEDED
Status: DISCONTINUED | OUTPATIENT
Start: 2025-01-28 | End: 2025-01-28

## 2025-01-28 RX ORDER — HYDRALAZINE HYDROCHLORIDE 20 MG/ML
5 INJECTION INTRAMUSCULAR; INTRAVENOUS EVERY 6 HOURS PRN
Status: DISCONTINUED | OUTPATIENT
Start: 2025-01-28 | End: 2025-01-29 | Stop reason: HOSPADM

## 2025-01-28 RX ORDER — ONDANSETRON HYDROCHLORIDE 2 MG/ML
4 INJECTION, SOLUTION INTRAVENOUS ONCE AS NEEDED
Status: COMPLETED | OUTPATIENT
Start: 2025-01-28 | End: 2025-01-28

## 2025-01-28 RX ORDER — FLUTICASONE FUROATE AND VILANTEROL 200; 25 UG/1; UG/1
1 POWDER RESPIRATORY (INHALATION)
Status: DISCONTINUED | OUTPATIENT
Start: 2025-01-29 | End: 2025-01-29 | Stop reason: HOSPADM

## 2025-01-28 RX ORDER — CEFAZOLIN 1 G/1
INJECTION, POWDER, FOR SOLUTION INTRAVENOUS AS NEEDED
Status: DISCONTINUED | OUTPATIENT
Start: 2025-01-28 | End: 2025-01-28

## 2025-01-28 RX ORDER — ACETAMINOPHEN 325 MG/1
650 TABLET ORAL EVERY 6 HOURS
Status: DISCONTINUED | OUTPATIENT
Start: 2025-01-28 | End: 2025-01-29 | Stop reason: HOSPADM

## 2025-01-28 RX ORDER — GLYCOPYRROLATE 0.2 MG/ML
INJECTION INTRAMUSCULAR; INTRAVENOUS AS NEEDED
Status: DISCONTINUED | OUTPATIENT
Start: 2025-01-28 | End: 2025-01-28

## 2025-01-28 RX ORDER — ALBUTEROL SULFATE 90 UG/1
2 INHALANT RESPIRATORY (INHALATION) EVERY 4 HOURS PRN
Status: DISCONTINUED | OUTPATIENT
Start: 2025-01-28 | End: 2025-01-29 | Stop reason: HOSPADM

## 2025-01-28 RX ORDER — ACETAMINOPHEN 10 MG/ML
INJECTION, SOLUTION INTRAVENOUS AS NEEDED
Status: DISCONTINUED | OUTPATIENT
Start: 2025-01-28 | End: 2025-01-28

## 2025-01-28 RX ORDER — KETOROLAC TROMETHAMINE 15 MG/ML
15 INJECTION, SOLUTION INTRAMUSCULAR; INTRAVENOUS EVERY 8 HOURS
Status: DISCONTINUED | OUTPATIENT
Start: 2025-01-28 | End: 2025-01-29 | Stop reason: HOSPADM

## 2025-01-28 RX ORDER — HYDROMORPHONE HYDROCHLORIDE 1 MG/ML
INJECTION, SOLUTION INTRAMUSCULAR; INTRAVENOUS; SUBCUTANEOUS AS NEEDED
Status: DISCONTINUED | OUTPATIENT
Start: 2025-01-28 | End: 2025-01-28

## 2025-01-28 RX ORDER — SODIUM CHLORIDE, SODIUM LACTATE, POTASSIUM CHLORIDE, CALCIUM CHLORIDE 600; 310; 30; 20 MG/100ML; MG/100ML; MG/100ML; MG/100ML
50 INJECTION, SOLUTION INTRAVENOUS CONTINUOUS
Status: DISCONTINUED | OUTPATIENT
Start: 2025-01-28 | End: 2025-01-29

## 2025-01-28 RX ORDER — DROPERIDOL 2.5 MG/ML
0.62 INJECTION, SOLUTION INTRAMUSCULAR; INTRAVENOUS ONCE AS NEEDED
Status: DISCONTINUED | OUTPATIENT
Start: 2025-01-28 | End: 2025-01-28 | Stop reason: HOSPADM

## 2025-01-28 RX ORDER — BUPIVACAINE HYDROCHLORIDE 5 MG/ML
INJECTION, SOLUTION EPIDURAL; INTRACAUDAL AS NEEDED
Status: DISCONTINUED | OUTPATIENT
Start: 2025-01-28 | End: 2025-01-28 | Stop reason: HOSPADM

## 2025-01-28 RX ORDER — MIDAZOLAM HYDROCHLORIDE 1 MG/ML
INJECTION INTRAMUSCULAR; INTRAVENOUS AS NEEDED
Status: DISCONTINUED | OUTPATIENT
Start: 2025-01-28 | End: 2025-01-28

## 2025-01-28 RX ORDER — SODIUM CHLORIDE 0.9 G/100ML
INJECTION, SOLUTION IRRIGATION AS NEEDED
Status: DISCONTINUED | OUTPATIENT
Start: 2025-01-28 | End: 2025-01-28 | Stop reason: HOSPADM

## 2025-01-28 RX ORDER — OXYCODONE HYDROCHLORIDE 5 MG/1
5 TABLET ORAL EVERY 6 HOURS PRN
Status: DISCONTINUED | OUTPATIENT
Start: 2025-01-28 | End: 2025-01-29

## 2025-01-28 RX ORDER — OXYCODONE HYDROCHLORIDE 10 MG/1
10 TABLET ORAL EVERY 4 HOURS PRN
Status: DISCONTINUED | OUTPATIENT
Start: 2025-01-28 | End: 2025-01-29

## 2025-01-28 RX ORDER — SODIUM CHLORIDE, SODIUM LACTATE, POTASSIUM CHLORIDE, CALCIUM CHLORIDE 600; 310; 30; 20 MG/100ML; MG/100ML; MG/100ML; MG/100ML
100 INJECTION, SOLUTION INTRAVENOUS CONTINUOUS
Status: DISCONTINUED | OUTPATIENT
Start: 2025-01-28 | End: 2025-01-28 | Stop reason: HOSPADM

## 2025-01-28 RX ORDER — NEOSTIGMINE METHYLSULFATE 1 MG/ML
INJECTION INTRAVENOUS AS NEEDED
Status: DISCONTINUED | OUTPATIENT
Start: 2025-01-28 | End: 2025-01-28

## 2025-01-28 RX ORDER — LABETALOL HYDROCHLORIDE 5 MG/ML
INJECTION, SOLUTION INTRAVENOUS AS NEEDED
Status: DISCONTINUED | OUTPATIENT
Start: 2025-01-28 | End: 2025-01-28

## 2025-01-28 RX ORDER — MEPERIDINE HYDROCHLORIDE 25 MG/ML
12.5 INJECTION INTRAMUSCULAR; INTRAVENOUS; SUBCUTANEOUS EVERY 10 MIN PRN
Status: DISCONTINUED | OUTPATIENT
Start: 2025-01-28 | End: 2025-01-28 | Stop reason: HOSPADM

## 2025-01-28 RX ORDER — PHENYLEPHRINE HCL IN 0.9% NACL 0.4MG/10ML
SYRINGE (ML) INTRAVENOUS AS NEEDED
Status: DISCONTINUED | OUTPATIENT
Start: 2025-01-28 | End: 2025-01-28

## 2025-01-28 RX ORDER — ONDANSETRON HYDROCHLORIDE 2 MG/ML
INJECTION, SOLUTION INTRAVENOUS AS NEEDED
Status: DISCONTINUED | OUTPATIENT
Start: 2025-01-28 | End: 2025-01-28

## 2025-01-28 RX ORDER — POLYETHYLENE GLYCOL 3350 17 G/17G
17 POWDER, FOR SOLUTION ORAL DAILY
Status: DISCONTINUED | OUTPATIENT
Start: 2025-01-28 | End: 2025-01-29 | Stop reason: HOSPADM

## 2025-01-28 RX ORDER — OXYCODONE HYDROCHLORIDE 5 MG/1
5 TABLET ORAL EVERY 4 HOURS PRN
Status: DISCONTINUED | OUTPATIENT
Start: 2025-01-28 | End: 2025-01-28 | Stop reason: HOSPADM

## 2025-01-28 RX ORDER — HYDROCORTISONE 20 MG/1
20 TABLET ORAL EVERY 12 HOURS SCHEDULED
Status: DISCONTINUED | OUTPATIENT
Start: 2025-01-28 | End: 2025-01-29 | Stop reason: HOSPADM

## 2025-01-28 RX ORDER — SODIUM CHLORIDE, SODIUM LACTATE, POTASSIUM CHLORIDE, CALCIUM CHLORIDE 600; 310; 30; 20 MG/100ML; MG/100ML; MG/100ML; MG/100ML
INJECTION, SOLUTION INTRAVENOUS CONTINUOUS PRN
Status: DISCONTINUED | OUTPATIENT
Start: 2025-01-28 | End: 2025-01-28

## 2025-01-28 RX ORDER — LIDOCAINE HYDROCHLORIDE 10 MG/ML
INJECTION, SOLUTION EPIDURAL; INFILTRATION; INTRACAUDAL; PERINEURAL AS NEEDED
Status: DISCONTINUED | OUTPATIENT
Start: 2025-01-28 | End: 2025-01-28 | Stop reason: HOSPADM

## 2025-01-28 RX ORDER — HYDROMORPHONE HYDROCHLORIDE 1 MG/ML
0.2 INJECTION, SOLUTION INTRAMUSCULAR; INTRAVENOUS; SUBCUTANEOUS EVERY 4 HOURS PRN
Status: DISCONTINUED | OUTPATIENT
Start: 2025-01-28 | End: 2025-01-29 | Stop reason: HOSPADM

## 2025-01-28 RX ORDER — ONDANSETRON HYDROCHLORIDE 2 MG/ML
8 INJECTION, SOLUTION INTRAVENOUS EVERY 6 HOURS PRN
Status: DISCONTINUED | OUTPATIENT
Start: 2025-01-28 | End: 2025-01-29 | Stop reason: HOSPADM

## 2025-01-28 RX ORDER — FENTANYL CITRATE 50 UG/ML
INJECTION, SOLUTION INTRAMUSCULAR; INTRAVENOUS AS NEEDED
Status: DISCONTINUED | OUTPATIENT
Start: 2025-01-28 | End: 2025-01-28

## 2025-01-28 RX ORDER — METOPROLOL TARTRATE 1 MG/ML
INJECTION, SOLUTION INTRAVENOUS AS NEEDED
Status: DISCONTINUED | OUTPATIENT
Start: 2025-01-28 | End: 2025-01-28

## 2025-01-28 RX ORDER — LIDOCAINE HYDROCHLORIDE 10 MG/ML
0.1 INJECTION, SOLUTION INFILTRATION; PERINEURAL ONCE
Status: DISCONTINUED | OUTPATIENT
Start: 2025-01-28 | End: 2025-01-28 | Stop reason: HOSPADM

## 2025-01-28 RX ORDER — NALOXONE HYDROCHLORIDE 0.4 MG/ML
0.2 INJECTION, SOLUTION INTRAMUSCULAR; INTRAVENOUS; SUBCUTANEOUS EVERY 5 MIN PRN
Status: DISCONTINUED | OUTPATIENT
Start: 2025-01-28 | End: 2025-01-29

## 2025-01-28 RX ORDER — PROPOFOL 10 MG/ML
INJECTION, EMULSION INTRAVENOUS AS NEEDED
Status: DISCONTINUED | OUTPATIENT
Start: 2025-01-28 | End: 2025-01-28

## 2025-01-28 RX ORDER — HYDROMORPHONE HYDROCHLORIDE 0.2 MG/ML
0.2 INJECTION INTRAMUSCULAR; INTRAVENOUS; SUBCUTANEOUS EVERY 5 MIN PRN
Status: DISCONTINUED | OUTPATIENT
Start: 2025-01-28 | End: 2025-01-28 | Stop reason: HOSPADM

## 2025-01-28 RX ADMIN — ONDANSETRON 4 MG: 2 INJECTION INTRAMUSCULAR; INTRAVENOUS at 14:08

## 2025-01-28 RX ADMIN — NEOSTIGMINE METHYLSULFATE 4 MG: 1 INJECTION INTRAVENOUS at 13:30

## 2025-01-28 RX ADMIN — MIDAZOLAM HYDROCHLORIDE 0.5 MG: 1 INJECTION, SOLUTION INTRAMUSCULAR; INTRAVENOUS at 11:28

## 2025-01-28 RX ADMIN — ROCURONIUM BROMIDE 50 MG: 10 INJECTION INTRAVENOUS at 11:12

## 2025-01-28 RX ADMIN — FENTANYL CITRATE 50 MCG: 50 INJECTION, SOLUTION INTRAMUSCULAR; INTRAVENOUS at 11:34

## 2025-01-28 RX ADMIN — ACETAMINOPHEN 1000 MG: 10 INJECTION, SOLUTION INTRAVENOUS at 13:12

## 2025-01-28 RX ADMIN — ACETAMINOPHEN 650 MG: 325 TABLET ORAL at 23:24

## 2025-01-28 RX ADMIN — HYDROMORPHONE HYDROCHLORIDE 0.5 MG: 1 INJECTION, SOLUTION INTRAMUSCULAR; INTRAVENOUS; SUBCUTANEOUS at 13:39

## 2025-01-28 RX ADMIN — ROCURONIUM BROMIDE 20 MG: 10 INJECTION INTRAVENOUS at 12:42

## 2025-01-28 RX ADMIN — ROCURONIUM BROMIDE 20 MG: 10 INJECTION INTRAVENOUS at 12:14

## 2025-01-28 RX ADMIN — Medication 120 MCG: at 12:49

## 2025-01-28 RX ADMIN — SODIUM CHLORIDE, POTASSIUM CHLORIDE, SODIUM LACTATE AND CALCIUM CHLORIDE: 600; 310; 30; 20 INJECTION, SOLUTION INTRAVENOUS at 11:11

## 2025-01-28 RX ADMIN — HYDROCORTISONE 20 MG: 20 TABLET ORAL at 23:25

## 2025-01-28 RX ADMIN — ENOXAPARIN SODIUM 40 MG: 100 INJECTION SUBCUTANEOUS at 17:42

## 2025-01-28 RX ADMIN — ROCURONIUM BROMIDE 20 MG: 10 INJECTION INTRAVENOUS at 11:48

## 2025-01-28 RX ADMIN — GLYCOPYRROLATE 0.6 MG: 0.2 INJECTION, SOLUTION INTRAMUSCULAR; INTRAVENOUS at 13:31

## 2025-01-28 RX ADMIN — ONDANSETRON 4 MG: 2 INJECTION INTRAMUSCULAR; INTRAVENOUS at 13:09

## 2025-01-28 RX ADMIN — SODIUM CHLORIDE, POTASSIUM CHLORIDE, SODIUM LACTATE AND CALCIUM CHLORIDE 50 ML/HR: 600; 310; 30; 20 INJECTION, SOLUTION INTRAVENOUS at 23:24

## 2025-01-28 RX ADMIN — DEXAMETHASONE SODIUM PHOSPHATE 8 MG: 4 INJECTION INTRA-ARTICULAR; INTRALESIONAL; INTRAMUSCULAR; INTRAVENOUS; SOFT TISSUE at 11:47

## 2025-01-28 RX ADMIN — LIDOCAINE HYDROCHLORIDE 80 MG: 20 INJECTION INTRAVENOUS at 11:12

## 2025-01-28 RX ADMIN — FENTANYL CITRATE 50 MCG: 50 INJECTION, SOLUTION INTRAMUSCULAR; INTRAVENOUS at 11:43

## 2025-01-28 RX ADMIN — CEFAZOLIN 2 G: 1 INJECTION, POWDER, FOR SOLUTION INTRAMUSCULAR; INTRAVENOUS at 11:30

## 2025-01-28 RX ADMIN — METOPROLOL TARTRATE 5 MG: 1 INJECTION, SOLUTION INTRAVENOUS at 11:12

## 2025-01-28 RX ADMIN — ACETAMINOPHEN 650 MG: 325 TABLET ORAL at 17:44

## 2025-01-28 RX ADMIN — LABETALOL HYDROCHLORIDE 5 MG: 5 INJECTION INTRAVENOUS at 11:59

## 2025-01-28 RX ADMIN — HYDROMORPHONE HYDROCHLORIDE 0.5 MG: 1 INJECTION, SOLUTION INTRAMUSCULAR; INTRAVENOUS; SUBCUTANEOUS at 13:27

## 2025-01-28 RX ADMIN — LABETALOL HYDROCHLORIDE 10 MG: 5 INJECTION INTRAVENOUS at 13:28

## 2025-01-28 RX ADMIN — LABETALOL HYDROCHLORIDE 5 MG: 5 INJECTION INTRAVENOUS at 11:47

## 2025-01-28 RX ADMIN — KETOROLAC TROMETHAMINE 15 MG: 15 INJECTION, SOLUTION INTRAMUSCULAR; INTRAVENOUS at 17:39

## 2025-01-28 RX ADMIN — PROPOFOL 150 MG: 10 INJECTION, EMULSION INTRAVENOUS at 11:12

## 2025-01-28 SDOH — SOCIAL STABILITY: SOCIAL INSECURITY: HAVE YOU HAD ANY THOUGHTS OF HARMING ANYONE ELSE?: NO

## 2025-01-28 SDOH — SOCIAL STABILITY: SOCIAL INSECURITY: ABUSE: ADULT

## 2025-01-28 SDOH — SOCIAL STABILITY: SOCIAL INSECURITY: DO YOU FEEL UNSAFE GOING BACK TO THE PLACE WHERE YOU ARE LIVING?: NO

## 2025-01-28 SDOH — SOCIAL STABILITY: SOCIAL INSECURITY: HAS ANYONE EVER THREATENED TO HURT YOUR FAMILY OR YOUR PETS?: NO

## 2025-01-28 SDOH — SOCIAL STABILITY: SOCIAL INSECURITY: ARE THERE ANY APPARENT SIGNS OF INJURIES/BEHAVIORS THAT COULD BE RELATED TO ABUSE/NEGLECT?: NO

## 2025-01-28 SDOH — SOCIAL STABILITY: SOCIAL INSECURITY: ARE YOU OR HAVE YOU BEEN THREATENED OR ABUSED PHYSICALLY, EMOTIONALLY, OR SEXUALLY BY ANYONE?: NO

## 2025-01-28 SDOH — SOCIAL STABILITY: SOCIAL INSECURITY: DO YOU FEEL ANYONE HAS EXPLOITED OR TAKEN ADVANTAGE OF YOU FINANCIALLY OR OF YOUR PERSONAL PROPERTY?: NO

## 2025-01-28 SDOH — SOCIAL STABILITY: SOCIAL INSECURITY: HAVE YOU HAD THOUGHTS OF HARMING ANYONE ELSE?: NO

## 2025-01-28 SDOH — SOCIAL STABILITY: SOCIAL INSECURITY: DOES ANYONE TRY TO KEEP YOU FROM HAVING/CONTACTING OTHER FRIENDS OR DOING THINGS OUTSIDE YOUR HOME?: NO

## 2025-01-28 SDOH — SOCIAL STABILITY: SOCIAL INSECURITY: WERE YOU ABLE TO COMPLETE ALL THE BEHAVIORAL HEALTH SCREENINGS?: YES

## 2025-01-28 ASSESSMENT — PAIN - FUNCTIONAL ASSESSMENT
PAIN_FUNCTIONAL_ASSESSMENT: UNABLE TO SELF-REPORT
PAIN_FUNCTIONAL_ASSESSMENT: 0-10
PAIN_FUNCTIONAL_ASSESSMENT: UNABLE TO SELF-REPORT
PAIN_FUNCTIONAL_ASSESSMENT: 0-10

## 2025-01-28 ASSESSMENT — LIFESTYLE VARIABLES
SKIP TO QUESTIONS 9-10: 1
HOW OFTEN DO YOU HAVE 6 OR MORE DRINKS ON ONE OCCASION: NEVER
HOW OFTEN DO YOU HAVE A DRINK CONTAINING ALCOHOL: MONTHLY OR LESS
PRESCIPTION_ABUSE_PAST_12_MONTHS: NO
AUDIT-C TOTAL SCORE: 1
SUBSTANCE_ABUSE_PAST_12_MONTHS: NO
AUDIT-C TOTAL SCORE: 1
HOW MANY STANDARD DRINKS CONTAINING ALCOHOL DO YOU HAVE ON A TYPICAL DAY: 1 OR 2

## 2025-01-28 ASSESSMENT — ACTIVITIES OF DAILY LIVING (ADL)
HEARING - LEFT EAR: HEARING AID
ADEQUATE_TO_COMPLETE_ADL: YES
FEEDING YOURSELF: INDEPENDENT
DRESSING YOURSELF: INDEPENDENT
WALKS IN HOME: INDEPENDENT
LACK_OF_TRANSPORTATION: NO
GROOMING: INDEPENDENT
TOILETING: INDEPENDENT
JUDGMENT_ADEQUATE_SAFELY_COMPLETE_DAILY_ACTIVITIES: YES
PATIENT'S MEMORY ADEQUATE TO SAFELY COMPLETE DAILY ACTIVITIES?: YES
HEARING - RIGHT EAR: HEARING AID
BATHING: INDEPENDENT

## 2025-01-28 ASSESSMENT — COGNITIVE AND FUNCTIONAL STATUS - GENERAL
PATIENT BASELINE BEDBOUND: NO
DAILY ACTIVITIY SCORE: 24
WALKING IN HOSPITAL ROOM: A LITTLE
WALKING IN HOSPITAL ROOM: A LITTLE
CLIMB 3 TO 5 STEPS WITH RAILING: A LITTLE
MOBILITY SCORE: 22
CLIMB 3 TO 5 STEPS WITH RAILING: A LITTLE

## 2025-01-28 ASSESSMENT — PATIENT HEALTH QUESTIONNAIRE - PHQ9
SUM OF ALL RESPONSES TO PHQ9 QUESTIONS 1 & 2: 0
2. FEELING DOWN, DEPRESSED OR HOPELESS: NOT AT ALL
1. LITTLE INTEREST OR PLEASURE IN DOING THINGS: NOT AT ALL

## 2025-01-28 ASSESSMENT — COLUMBIA-SUICIDE SEVERITY RATING SCALE - C-SSRS
6. HAVE YOU EVER DONE ANYTHING, STARTED TO DO ANYTHING, OR PREPARED TO DO ANYTHING TO END YOUR LIFE?: NO
1. IN THE PAST MONTH, HAVE YOU WISHED YOU WERE DEAD OR WISHED YOU COULD GO TO SLEEP AND NOT WAKE UP?: NO
2. HAVE YOU ACTUALLY HAD ANY THOUGHTS OF KILLING YOURSELF?: NO

## 2025-01-28 ASSESSMENT — PAIN SCALES - GENERAL
PAINLEVEL_OUTOF10: 4
PAINLEVEL_OUTOF10: 5 - MODERATE PAIN
PAINLEVEL_OUTOF10: 0 - NO PAIN
PAINLEVEL_OUTOF10: 2
PAINLEVEL_OUTOF10: 4
PAINLEVEL_OUTOF10: 4
PAINLEVEL_OUTOF10: 0 - NO PAIN
PAINLEVEL_OUTOF10: 0 - NO PAIN

## 2025-01-28 ASSESSMENT — PAIN DESCRIPTION - ORIENTATION: ORIENTATION: RIGHT

## 2025-01-28 ASSESSMENT — PAIN DESCRIPTION - LOCATION
LOCATION: ABDOMEN
LOCATION: OTHER (COMMENT)

## 2025-01-28 NOTE — ANESTHESIA PROCEDURE NOTES
Airway  Date/Time: 1/28/2025 11:15 AM  Urgency: elective    Airway not difficult    Staffing  Performed: attending   Authorized by: Donato Chou MD    Performed by: SANJUANA Sanchez  Patient location during procedure: OR    Indications and Patient Condition  Indications for airway management: anesthesia  Spontaneous Ventilation: absent  Sedation level: deep  Preoxygenated: yes  Patient position: sniffing  Mask difficulty assessment: 1 - vent by mask    Final Airway Details  Final airway type: endotracheal airway      Successful airway: ETT  Cuffed: yes   Successful intubation technique: direct laryngoscopy  Facilitating devices/methods: intubating stylet  Endotracheal tube insertion site: oral  Blade: Ca  Blade size: #3  ETT size (mm): 7.5  Cormack-Lehane Classification: grade I - full view of glottis  Placement verified by: chest auscultation and capnometry   Measured from: teeth  ETT to teeth (cm): 22  Number of attempts at approach: 1

## 2025-01-28 NOTE — CARE PLAN
The patient's goals for the shift include  adequate pain relief    The clinical goals for the shift include Get ready to go home      Problem: Pain  Goal: Takes deep breaths with improved pain control throughout the shift  Outcome: Progressing  Goal: Turns in bed with improved pain control throughout the shift  Outcome: Progressing  Goal: Walks with improved pain control throughout the shift  Outcome: Progressing  Goal: Performs ADL's with improved pain control throughout shift  Outcome: Progressing  Goal: Free from opioid side effects throughout the shift  Outcome: Progressing  Goal: Free from acute confusion related to pain meds throughout the shift  Outcome: Progressing     Problem: Fall/Injury  Goal: Not fall by end of shift  Outcome: Progressing  Goal: Be free from injury by end of the shift  Outcome: Progressing  Goal: Verbalize understanding of personal risk factors for fall in the hospital  Outcome: Progressing  Goal: Verbalize understanding of risk factor reduction measures to prevent injury from fall in the home  Outcome: Progressing  Goal: Pace activities to prevent fatigue by end of the shift  Outcome: Progressing     Problem: Skin  Goal: Decreased wound size/increased tissue granulation at next dressing change  Outcome: Progressing  Goal: Participates in plan/prevention/treatment measures  Outcome: Progressing  Goal: Prevent/manage excess moisture  Outcome: Progressing  Goal: Prevent/minimize sheer/friction injuries  Outcome: Progressing  Goal: Promote/optimize nutrition  Outcome: Progressing  Goal: Promote skin healing  Outcome: Progressing

## 2025-01-28 NOTE — BRIEF OP NOTE
Date: 2025  OR Location: [facility] OR    Name: Noemí Reece, : 1955, Age: 70 y.o., MRN: [id number], Sex: female    Diagnosis  Pre-op Diagnosis      * Right adrenal mass (Multi) [E27.8] Post-op Diagnosis     * Right adrenal mass (Multi) [E27.8]     Procedures  ADRENALECTOMY, LAPAROSCOPIC  17816 - ID LAPAROSCOPY ADRENALECTOMY PRTL/COMPL TABDL      Surgeons      * Cyrus Ervin - Primary    Resident/Fellow/Other Assistant:  Ramin Woo - Resident    Staff:   Circulator: Ashleigh Contrerasub Person: Ezequiel  Circulator: Candelaria Escobedo Circulator: Pema Escobedo Scrub: Kamla    Anesthesia Staff: Anesthesiologist: Donato Chou MD  CRNA: Tresa Oconnell, APRN-CRNA; DAVID Beatty-CRNA    Procedure Summary  Anesthesia: General  ASA: II  Estimated Blood Loss: 75mL  Intra-op Medications:   Administrations occurring from 1100 to 1445 on 25:   Medication Name Total Dose   sodium chloride 0.9 % irrigation solution 1,000 mL   lidocaine PF (Xylocaine) 10 mg/mL (1 %) injection 3 mL   bupivacaine PF (Marcaine) 0.5 % (5 mg/mL) injection 3 mL   acetaminophen (Ofirmev) injection 1,000 mg   ceFAZolin (Ancef) vial 1 g 2 g   dexAMETHasone (Decadron) injection 4 mg/mL 8 mg   fentaNYL (Sublimaze) injection 50 mcg/mL 100 mcg   glycopyrrolate (Robinul) injection 0.6 mg   HYDROmorphone (Dilaudid) injection 1 mg/mL 0.5 mg   labetalol (Normodyne,Trandate) injection 20 mg   lactated Ringer's infusion Cannot be calculated   lidocaine (cardiac) injection 2% prefilled syringe 80 mg   metoprolol tartrate (Lopressor) injection 5 mg   midazolam PF (Versed) injection 1 mg/mL 0.5 mg   neostigmine (Bloxiverz) 10 mg/10 mL injection 4 mg   ondansetron (Zofran) 2 mg/mL injection 4 mg   phenylephrine 40 mcg/mL syringe 10 mL 120 mcg   propofol (Diprivan) injection 10 mg/mL 150 mg   rocuronium (ZeMuron) 50 mg/5 mL injection 110 mg              Anesthesia Record               Intraprocedure I/O Totals          Output    Urine  200 mL    Est. Blood Loss 75 mL    Total Output 275 mL          Specimen:   ID Type Source Tests Collected by Time   1 : right adrenal mass Tissue ADRENAL RESECTION RIGHT SURGICAL PATHOLOGY EXAM Cyrus Ervin MD 1/28/2025 1310        Findings: large, well encapsulated right adrenal mass. Incidental small amount of peritoneal melanosis seen on the greater omentum.    Complications:  None; patient tolerated the procedure well.     Disposition: PACU - hemodynamically stable.  Condition: stable  Specimens Collected:   ID Type Source Tests Collected by Time   1 : right adrenal mass Tissue ADRENAL RESECTION RIGHT SURGICAL PATHOLOGY EXAM Cyrus Ervin MD 1/28/2025 1310     Attending Attestation: I was present and scrubbed for the entire procedure.    Cyrus Ervin  Phone Number: [phone]

## 2025-01-28 NOTE — H&P
"See copy/pasted H&P from office visit 12/23. There are no significant changes. Patient is here for laparoscopic right adrenalectomy.    Ramin Woo MD  General Surgery, pgy4  [place] [phone]    Patient discussed with attending.     \"Noemí Reece is a 69 y.o. female who had a CT scan of the chest as a lung screening protocol.  During that study on the lower cuts she was found to have a 4.5 cm mass sitting just below the liver that they felt could be coming from the right adrenal gland or an exophytic liver lesion.  Therefore she was sent for an MRI of the abdomen on July 17, 2024.  This was done in the [facility] system.  This was read as a likely 6.3 cm right-sided adrenal mass.  These images are not available for my review today.  We will request that they get pushed over for further imaging.  If the MRI of his poor quality we may need to request a CT scan adrenal protocol to be done within the  system.  This may be necessary for accurate surgical planning.     Patient then met with Dr. Wheeler of medical endocrinology at [facility].  Dr. Wheeler did a biochemical workup on this.  24-hour catecholamines were negative for pheochromocytoma.  Urinary cortisol levels were normal.  Plasma cortisol was 8.8.  After dexamethasone suppression testing cortisol only dropped to 6.2.  ACTH level was low normal at 4.4.  Therefore the patient may have some subclinical Cushing's.  There is also mention in the note the patient was being sent for renin and aldosterone levels.  I saw a normal aldosterone level of 7.9 but I do not see a corresponding renin level.  However with that level of aldosterone it is less likely the patient has primary hyperaldosteronism.     She does have well-controlled hypertension on 2 drug therapy.     Patient stated that after the MRI was done at the [facility] that one of the radiologist reviewed old imaging on her and told her that she may have had an adrenal mass present for almost 20 " years and it looks like it has been slowly growing over time.  She thinks some of these images may have been done at [facility] which would be a [facility] affiliate.  Therefore we will also be requesting those images.  That could be very helpful to determine the history of this mass and the timeframe over which it grew.  I told her that slow-growing masses over time would be less likely to be something malignant.     She denies any real abdominal pain.  Perhaps some occasional right flank pain.  This is very intermittent.  No troubles with constipation no difficulty passing urine no blood in her urine or stool.  She said that over the last 3 years she has been trying to lose some weight and she lost about 20 pounds since she retired.  This was intentional.  Appetite remains good.     Family history-no adrenal disorders no adrenal cancer.  She said there is a history of colon cancer and breast cancer in her family.     Review of Systems   Constitutional: Negative.    HENT: Negative.     Eyes: Negative.    Respiratory: Negative.     Cardiovascular: Negative.    Endocrine: Negative.    Genitourinary: Negative.    Musculoskeletal: Negative.    Neurological: Negative.    Psychiatric/Behavioral: Negative.              Objective  Physical Exam  Vitals reviewed.   Constitutional:       Appearance: Normal appearance.   Eyes:      General: No scleral icterus.  Neck:      Vascular: No carotid bruit.      Comments: Thyroid gland feels normal.  No palpable masses  Cardiovascular:      Rate and Rhythm: Normal rate and regular rhythm.      Heart sounds: Normal heart sounds.   Pulmonary:      Effort: Pulmonary effort is normal.      Breath sounds: Normal breath sounds.   Abdominal:      General: Abdomen is flat. There is no distension.      Palpations: Abdomen is soft. There is no mass.      Tenderness: There is no right CVA tenderness or left CVA tenderness.   Musculoskeletal:         General: Normal range of  motion.   Lymphadenopathy:      Cervical: No cervical adenopathy.   Skin:     General: Skin is warm and dry.      Coloration: Skin is not jaundiced.   Neurological:      General: No focal deficit present.      Mental Status: She is alert and oriented to person, place, and time.   Psychiatric:         Mood and Affect: Mood normal.         Behavior: Behavior normal.               Assessment/Plan  Mrs. Reece had incidental discovery of an adrenal mass during a recent lung screening CT scan.  This was followed up by an MRI showing a 6.3 cm right adrenal mass.  Patient however told me that when the radiologist did some further review of old imaging on her that she was told that she had this adrenal mass dating back almost 20 years.  She thinks most of these may have been done with the [facility] system at [facility].  Therefore we will request some of her old imaging to see if we can get a timetable on how long the mass has been present and any sort of growth rate.     Masses not causing any abdominal pain for her.  No other functional symptoms.     She does have well-controlled hypertension.  She did not completely suppressed on a dexamethasone suppression test done by her endocrinologist.  Her workup was however negative for pheochromocytoma and for hyperaldosteronism.     I told her and her  that in general for adrenal masses greater than 6 cm we would recommend resection.  We will get the images of her MRI transferred to us so I can review those.  Will also try to get any old radiology records to look at a growth pattern for this.    Result Communication-patient that her imaging looks most consistent with a benign adrenal nodule.  It does look amenable to laparoscopic resection.     I will have my office staff contact her to select a surgical date in November or December that is convenient for her schedule.         Resulted Orders   CT adrenals w and wo IV contrast     Narrative      Interpreted By:  Piyush Kirk,   STUDY:  CT ADRENALS W AND WO IV CONTRAST;  9/4/2024 12:40 pm      INDICATION:  Signs/Symptoms:adrenal mass.      ,E27.8 Other specified disorders of adrenal gland (Multi)      COMPARISON:  Abdomen MRI 05/20/2024      ACCESSION NUMBER(S):  [id number]      ORDERING CLINICIAN:  BEE BONNER      TECHNIQUE:  CT of the abdomen was performed.  Contiguous axial images were  obtained at 3 mm slice thickness through the abdomen. Coronal and  sagittal reconstructions at 3 mm slice thickness were performed.   75  ML of Omnipaque 350 was administered intravenously without immediate  complication.      FINDINGS:  LOWER CHEST:  Bibasal atelectasis.      ABDOMEN:      LIVER:  Normal size. Normal contour. Normal density. Scattered simple cysts.      BILE DUCTS:  The bile ducts are not dilated.      GALLBLADDER:  Gallbladder: No calcified stones. No wall thickening.      PANCREAS:  The pancreas appears unremarkable.      SPLEEN:  Within normal limits.      ADRENAL GLANDS:  No left adrenal nodules.      Right adrenal nodule measuring 6.2 cm with the areas of macroscopic  fat in tiny foci of calcification. Precontrast average Hounsfield  unit of 8, enhanced CT Hounsfield unit number of 41 and delayed phase  Hounsfield unit number of 16 : Absolute washout: 76%  Relative washout : 61%      KIDNEYS AND URETERS:  The kidneys have normal size and enhance symmetrically.  No  hydroureteronephrosis or nephroureterolithiasis is present.      BOWEL:  No bowel dilatation. Mild colonic stool burden. No ascites or  pneumoperitoneum.      VESSELS:  Multifocal vascular calcifications and atherosclerotic changes      PERITONEUM/RETROPERITONEUM/LYMPH NODES:  No enlarged lymph nodes      ABDOMINAL WALL:  The abdominal wall soft tissues appear normal.      BONES:  Multilevel degenerative spine changes and facet joint disease.  Benign-looking sclerotic focus in L4 vertebral body.         Impression     1. Right  "adrenal nodule measuring 6.2 cm with absolute and relative  washout favoring adenoma, alternatively there are areas of possible  macroscopic fat which also could be seen with myelolipoma. Few flecks  of calcifications noted which could be seen in myelolipoma.  Alternative and remote possibility of rare dermoid cyst of the  adrenal gland given morphology. Old films if available for  comparison, would be of great value for documenting stability and  predicting benign nature of lesion. To note, given the size of this  lesion, this observation would be indeterminate and warrant further  workup as adrenocortical carcinoma (although felt less likely) could  not be entirely excluded without comparison.  2. Few scattered simple hepatic cysts.      MACRO:  None      Signed by: Piyush Kirk 9/6/2024 1:25 PM  Dictation workstation:   [id number]     Plan    I am scheduling her for a right laparoscopic adrenalectomy at [facility] on Tuesday, January 28, 2025.  My office staff will contact her the day before surgery with what time to arrive.  2.   My nurse will send her one of our preoperative packets and we can work on information regarding her bowel prep as well as preoperative laboratory testing.\"  "

## 2025-01-28 NOTE — ANESTHESIA PROCEDURE NOTES
Arterial Line:    Date/Time: 1/28/2025 11:19 AM    Staffing  Performed: CRNA   Authorized by: Donato Chou MD    Performed by: SANJUANA Sanchez    An arterial line was placed. Procedure performed using surface landmarks.in the OR for the following indication(s): continuous blood pressure monitoring.    A 20 gauge (size) (length), Arrow (type) catheter was placed into the Left radial artery, secured by Tegaderm,   Seldinger technique used.  Events:  patient tolerated procedure well with no complications.

## 2025-01-28 NOTE — OP NOTE
ADRENALECTOMY, LAPAROSCOPIC (R) Operative Note     Date: 2025  OR Location: [facility] OR    Name: Noemí Reece, : 1955, Age: 70 y.o., MRN: [id number], Sex: female    Diagnosis  Pre-op Diagnosis      * Right adrenal mass (Multi) [E27.8] Post-op Diagnosis     * Right adrenal mass (Multi) [E27.8]     Procedures  ADRENALECTOMY, LAPAROSCOPIC  30582 - MT LAPAROSCOPY ADRENALECTOMY PRTL/COMPL TABDL      Surgeons      * Cyrus Ervin - Primary    Resident/Fellow/Other Assistant:  Surgeons and Role:  * No surgeons found with a matching role *    Staff:   Circulator: Ashleigh Contrerasub Person: Ezequiel  Circulator: Candelaria Escobedo Circulator: Pema Escobedo Scrub: Kamla    Anesthesia Staff: Anesthesiologist: Donato Chou MD  CRNA: Tresa Oconnell, APRN-CRNA; Kimani Vann APRN-CRNA    Procedure Summary  Anesthesia: General  ASA: II  Estimated Blood Loss: 75 mL  Intra-op Medications:   Administrations occurring from 1100 to 1445 on 25:   Medication Name Total Dose   sodium chloride 0.9 % irrigation solution 1,000 mL   lidocaine PF (Xylocaine) 10 mg/mL (1 %) injection 3 mL   bupivacaine PF (Marcaine) 0.5 % (5 mg/mL) injection 3 mL   acetaminophen (Ofirmev) injection 1,000 mg   ceFAZolin (Ancef) vial 1 g 2 g   dexAMETHasone (Decadron) injection 4 mg/mL 8 mg   fentaNYL (Sublimaze) injection 50 mcg/mL 100 mcg   glycopyrrolate (Robinul) injection 0.6 mg   HYDROmorphone (Dilaudid) injection 1 mg/mL 1 mg   labetalol (Normodyne,Trandate) injection 20 mg   lactated Ringer's infusion Cannot be calculated   lidocaine (cardiac) injection 2% prefilled syringe 80 mg   metoprolol tartrate (Lopressor) injection 5 mg   midazolam PF (Versed) injection 1 mg/mL 0.5 mg   neostigmine (Bloxiverz) 10 mg/10 mL injection 4 mg   ondansetron (Zofran) 2 mg/mL injection 4 mg   phenylephrine 40 mcg/mL syringe 10 mL 120 mcg   propofol (Diprivan) injection 10 mg/mL 150 mg   rocuronium (ZeMuron) 50 mg/5 mL injection 110 mg               Anesthesia Record               Intraprocedure I/O Totals          Output    Urine 200 mL    Est. Blood Loss 75 mL    Total Output 275 mL          Specimen:   ID Type Source Tests Collected by Time   1 : right adrenal mass Tissue ADRENAL RESECTION RIGHT SURGICAL PATHOLOGY EXAM Cyrus Ervin MD 1/28/2025 1310                 Drains and/or Catheters: * None in log *    Tourniquet Times:         Implants:     Findings: 6 cm right adrenal mass.  Well-contained borders no evidence of malignancy.    Indications: Noemí Reece is an 70 y.o. female who is having surgery for Right adrenal mass (Multi) [E27.8].  Patient had an incidental finding of a right adrenal mass during a CT scan of the chest as a lung screening protocol.  This was confirmed on an adrenal protocol CT scan showing a 6.3 cm right sided adrenal mass.  She was worked up by Dr. Wheeler of medical endocrinology at [facility].  Specimen was negative for pheochromocytoma or aldosteronoma.  Her plasma cortisol level was 8.8.  However after dexamethasone suppression testing and only dropped to 6.2 and her ACTH level was low.  Therefore she had evidence of subclinical Cushing's.  I discussed surgery with her.  Risk benefits were discussed she agreed and signed consent.    The patient was seen in the preoperative area. The risks, benefits, complications, treatment options, non-operative alternatives, expected recovery and outcomes were discussed with the patient. The possibilities of reaction to medication, pulmonary aspiration, injury to surrounding structures, bleeding, recurrent infection, the need for additional procedures, failure to diagnose a condition, and creating a complication requiring transfusion or operation were discussed with the patient. The patient concurred with the proposed plan, giving informed consent.  The site of surgery was properly noted/marked if necessary per policy. The patient has been actively warmed in preoperative  area. Preoperative antibiotics have been ordered and given within 1 hours of incision. Venous thrombosis prophylaxis have been ordered including bilateral sequential compression devices    Procedure Details: On the operative date patient's brought to the operating room after induction of anesthetic she had a Garcia catheter and orogastric tube inserted.  Anesthesia team placed an additional IV and a arterial line for blood pressure monitoring during surgery.  She is placed into left lateral decubitus position and appropriately padded.  We made a 1-1/2 cm incision 1 handbreadth below the right costal margin and over the right rectus muscle.  We then used a Visiport with a 10 mm scope and entered directly into the abdomen with no difficulties.  We insufflated to 15 mmHg.  Peritoneal surfaces all looked clean.  She did have some adhesions to the gallbladder nodule liver.  We placed a working 5 mm port in the epigastrium and through that we placed a juana flex liver retractor.  We then placed 2 other 5 mm ports underneath the right costal margin.  We first took down some adhesions of the omentum to the gallbladder to let the colon dropped down.  We then freed up some attachments to the inferior edge of the liver.  In doing so we were able to get the liver retractor in better position rolled the liver superior medial and we could start to see the mass in the retroperitoneum.  We are also able to see the inferior vena cava medial to it.  Began taking some of the retroperitoneum over the top of the mass below the edge of the liver.  We freed that all the way out to the triangular ligament of the diaphragm.    We then went in open along the inferior border of the mass.  In doing so we exposed and identified the inferior adrenal artery.  We dissected around that circumferentially with a laparoscopic true right angle placed locking hemoclips and divided it with good hemostasis.  I was then able to place a Kitner underneath  the adrenal gland and began lifting it up out of the retroperitoneum at the top of the psoas muscle and laterally away from the inferior vena cava.  I then began taking loose areolar tissue along the medial border of the adrenal with the LigaSure device.  At about the 3 o'clock position we identified the middle adrenal artery.  Again this was dissected around clipped and divided.  We then began working along the upper portion of the adrenal.  We got a small amount of bleeding from the surface of the adrenal gland itself.  This was able to be controlled with localized pressure and suction  device.  I continued dissection and was able to free up more at the top corner of the adrenal.  We could see that the mass itself was well-contained and there was a small normal tongue of adrenal tissue heading up posterior to the inferior vena cava and the liver.  Therefore I dissected around that and I was able to place locking hemoclips and serially divided across that area with clips and the LigaSure device.  That did leave a very tiny amount of normal adrenal tissue up underneath the edge of the liver.  We had good hemostasis at that point.    We then took the last the attachments of the adrenal gland to its posterior lateral portions of the abdominal wall and free the mass.  We irrigated out the adrenal bed everything appeared hemostatic.  We did use some Surgicel along the cut edge of the adrenal for final hemostasis.  Clips were all intact.  We switched to a 5 mm scope placed in Endo Catch bag and through the 10 mm port bag the specimen.  We took down our liver retractor desufflated the abdomen removed all of our ports and everything was hemostatic.  We then extended our incision slightly through the right rectus muscle opening some of the anterior and posterior sheath the split the muscle fibers apart in order to extract the adrenal gland intact in the Endo Catch bag.  This was sent off to pathology.    We then  grasped the posterior fascia with Backus clamps.  We closed the posterior fascia with a running 0 Vicryl suture same in the anterior fascia.  4-0 Vicryl's were used in the skin.  Half percent Marcaine was used for pain control and Dermabond was applied to all the wounds.  Catheter will be left in place.  Complications:  None; patient tolerated the procedure well.    Disposition: PACU - hemodynamically stable.  Condition: stable                 Additional Details: Patient will have a cortisol level checked in the PACU and we will monitor that over tonight to be sure she does not develop any adrenal insufficiency related to her subclinical Cushing's.    Attending Attestation: I was present for the entire procedure.    Cyrus Ervin  Phone Number: [phone]

## 2025-01-28 NOTE — SIGNIFICANT EVENT
Patient is a 69 yo F with a large ~6cm right sided adrenal mass that radiographically is most consistent with myelolipoma. Preoperative labs were consistent with Cushings although in her case this would be subclinical. She does have a history of HTN however. Now status post laparoscopic right adrenalectomy.     Post-operative plan:  - holding home valsartan/hydrochlorothiazide and amlodipine in perioperative period, PRN hydralazine  - CLD ADAT  - home inhalers  - PACU cortisol level, 1730 cortisol, and AM cortisol, discuss PO steroids pending 1730 cortisol if <2ug/dL  - AM labs  - SCDs/LVX  - PT    Ramin Woo MD  General Surgery, pgy4  [place] [phone]    Patient discussed with attending.

## 2025-01-28 NOTE — ANESTHESIA PREPROCEDURE EVALUATION
Patient: Noemí Reece    Procedure Information       Date/Time: 01/28/25 1100    Procedure: ADRENALECTOMY, LAPAROSCOPIC (Right)    Location: [facility] OR 22 / [facility] OR    Surgeons: Cyrus Ervin MD            Relevant Problems   Cardiac   (+) HTN (hypertension)      Endocrine  Adrenal mass not c/w pheo or aldosteronoma       Clinical information reviewed:     Meds               NPO Detail:  No data recorded     PHYSICAL EXAM    Anesthesia Plan    History of general anesthesia?: yes  History of complications of general anesthesia?: no    ASA 2     general     intravenous induction   Anesthetic plan and risks discussed with patient.    Plan discussed with CRNA.

## 2025-01-28 NOTE — ANESTHESIA PROCEDURE NOTES
Peripheral IV  Date/Time: 1/28/2025 11:22 AM  Inserted by: DAVID Sanchez-MADDIE    Placement  Needle size: 16 G  Laterality: left  Location: hand  Local anesthetic: none  Site prep: alcohol  Technique: anatomical landmarks  Attempts: 1

## 2025-01-28 NOTE — ANESTHESIA POSTPROCEDURE EVALUATION
Patient: Noemí Reece    Procedure Summary       Date: 01/28/25 Room / Location: [facility] OR 22 / [facility] OR    Anesthesia Start: 1108 Anesthesia Stop: 1346    Procedure: ADRENALECTOMY, LAPAROSCOPIC (Right) Diagnosis:       Right adrenal mass (Multi)      (Right adrenal mass (Multi) [E27.8])    Surgeons: Cyrus Ervin MD Responsible Provider: Donato Chou MD    Anesthesia Type: general ASA Status: 2            Anesthesia Type: general    Vitals Value Taken Time   /63 01/28/25 1445   Temp 36.1 °C (97 °F) 01/28/25 1341   Pulse 58 01/28/25 1451   Resp 13 01/28/25 1451   SpO2 92 % 01/28/25 1451   Vitals shown include unfiled device data.    Anesthesia Post Evaluation    Patient location during evaluation: PACU  Patient participation: complete - patient participated  Level of consciousness: awake  Pain management: adequate  Airway patency: patent  Cardiovascular status: acceptable  Respiratory status: acceptable  Hydration status: acceptable  Postoperative Nausea and Vomiting: none        No notable events documented.    
Abdominal Pain, N/V/D

## 2025-01-29 ENCOUNTER — PHARMACY VISIT (OUTPATIENT)
Dept: PHARMACY | Facility: CLINIC | Age: 70
End: 2025-01-29
Payer: COMMERCIAL

## 2025-01-29 VITALS
RESPIRATION RATE: 18 BRPM | BODY MASS INDEX: 31.91 KG/M2 | OXYGEN SATURATION: 93 % | HEIGHT: 68 IN | HEART RATE: 70 BPM | WEIGHT: 210.54 LBS | DIASTOLIC BLOOD PRESSURE: 68 MMHG | SYSTOLIC BLOOD PRESSURE: 149 MMHG | TEMPERATURE: 99.8 F

## 2025-01-29 PROBLEM — E27.8 RIGHT ADRENAL MASS (MULTI): Status: RESOLVED | Noted: 2024-12-23 | Resolved: 2025-01-29

## 2025-01-29 LAB
ALBUMIN SERPL BCP-MCNC: 3.8 G/DL (ref 3.4–5)
ANION GAP SERPL CALC-SCNC: 14 MMOL/L (ref 10–20)
BASOPHILS # BLD AUTO: 0.01 X10*3/UL (ref 0–0.1)
BASOPHILS NFR BLD AUTO: 0.1 %
BUN SERPL-MCNC: 19 MG/DL (ref 6–23)
CALCIUM SERPL-MCNC: 8.9 MG/DL (ref 8.6–10.6)
CHLORIDE SERPL-SCNC: 103 MMOL/L (ref 98–107)
CO2 SERPL-SCNC: 26 MMOL/L (ref 21–32)
CORTIS SERPL-MCNC: 10.2 UG/DL (ref 2.5–20)
CREAT SERPL-MCNC: 0.98 MG/DL (ref 0.5–1.05)
EGFRCR SERPLBLD CKD-EPI 2021: 62 ML/MIN/1.73M*2
EOSINOPHIL # BLD AUTO: 0.01 X10*3/UL (ref 0–0.4)
EOSINOPHIL NFR BLD AUTO: 0.1 %
ERYTHROCYTE [DISTWIDTH] IN BLOOD BY AUTOMATED COUNT: 12.9 % (ref 11.5–14.5)
GLUCOSE SERPL-MCNC: 124 MG/DL (ref 74–99)
HCT VFR BLD AUTO: 37.1 % (ref 36–46)
HGB BLD-MCNC: 12.5 G/DL (ref 12–16)
IMM GRANULOCYTES # BLD AUTO: 0.06 X10*3/UL (ref 0–0.5)
IMM GRANULOCYTES NFR BLD AUTO: 0.5 % (ref 0–0.9)
LYMPHOCYTES # BLD AUTO: 1.57 X10*3/UL (ref 0.8–3)
LYMPHOCYTES NFR BLD AUTO: 13.7 %
MAGNESIUM SERPL-MCNC: 2.55 MG/DL (ref 1.6–2.4)
MCH RBC QN AUTO: 29.6 PG (ref 26–34)
MCHC RBC AUTO-ENTMCNC: 33.7 G/DL (ref 32–36)
MCV RBC AUTO: 88 FL (ref 80–100)
MONOCYTES # BLD AUTO: 0.8 X10*3/UL (ref 0.05–0.8)
MONOCYTES NFR BLD AUTO: 7 %
NEUTROPHILS # BLD AUTO: 8.98 X10*3/UL (ref 1.6–5.5)
NEUTROPHILS NFR BLD AUTO: 78.6 %
NRBC BLD-RTO: 0 /100 WBCS (ref 0–0)
PHOSPHATE SERPL-MCNC: 2.9 MG/DL (ref 2.5–4.9)
PLATELET # BLD AUTO: 174 X10*3/UL (ref 150–450)
POTASSIUM SERPL-SCNC: 4 MMOL/L (ref 3.5–5.3)
RBC # BLD AUTO: 4.22 X10*6/UL (ref 4–5.2)
SODIUM SERPL-SCNC: 139 MMOL/L (ref 136–145)
WBC # BLD AUTO: 11.4 X10*3/UL (ref 4.4–11.3)

## 2025-01-29 PROCEDURE — 2500000001 HC RX 250 WO HCPCS SELF ADMINISTERED DRUGS (ALT 637 FOR MEDICARE OP): Performed by: STUDENT IN AN ORGANIZED HEALTH CARE EDUCATION/TRAINING PROGRAM

## 2025-01-29 PROCEDURE — 82533 TOTAL CORTISOL: CPT | Performed by: STUDENT IN AN ORGANIZED HEALTH CARE EDUCATION/TRAINING PROGRAM

## 2025-01-29 PROCEDURE — 85025 COMPLETE CBC W/AUTO DIFF WBC: CPT | Performed by: STUDENT IN AN ORGANIZED HEALTH CARE EDUCATION/TRAINING PROGRAM

## 2025-01-29 PROCEDURE — 80069 RENAL FUNCTION PANEL: CPT | Performed by: STUDENT IN AN ORGANIZED HEALTH CARE EDUCATION/TRAINING PROGRAM

## 2025-01-29 PROCEDURE — 97161 PT EVAL LOW COMPLEX 20 MIN: CPT | Mod: GP

## 2025-01-29 PROCEDURE — RXMED WILLOW AMBULATORY MEDICATION CHARGE

## 2025-01-29 PROCEDURE — 83735 ASSAY OF MAGNESIUM: CPT | Performed by: STUDENT IN AN ORGANIZED HEALTH CARE EDUCATION/TRAINING PROGRAM

## 2025-01-29 PROCEDURE — 82565 ASSAY OF CREATININE: CPT | Performed by: STUDENT IN AN ORGANIZED HEALTH CARE EDUCATION/TRAINING PROGRAM

## 2025-01-29 PROCEDURE — 2500000004 HC RX 250 GENERAL PHARMACY W/ HCPCS (ALT 636 FOR OP/ED): Performed by: STUDENT IN AN ORGANIZED HEALTH CARE EDUCATION/TRAINING PROGRAM

## 2025-01-29 PROCEDURE — 2500000001 HC RX 250 WO HCPCS SELF ADMINISTERED DRUGS (ALT 637 FOR MEDICARE OP)

## 2025-01-29 PROCEDURE — 94640 AIRWAY INHALATION TREATMENT: CPT

## 2025-01-29 PROCEDURE — 36415 COLL VENOUS BLD VENIPUNCTURE: CPT | Performed by: STUDENT IN AN ORGANIZED HEALTH CARE EDUCATION/TRAINING PROGRAM

## 2025-01-29 RX ORDER — HYDROCORTISONE 20 MG/1
20 TABLET ORAL EVERY 12 HOURS SCHEDULED
Qty: 60 TABLET | Refills: 0 | Status: SHIPPED | OUTPATIENT
Start: 2025-01-29 | End: 2025-02-28

## 2025-01-29 RX ORDER — OXYCODONE HYDROCHLORIDE 5 MG/1
5 TABLET ORAL EVERY 6 HOURS PRN
Status: DISCONTINUED | OUTPATIENT
Start: 2025-01-29 | End: 2025-01-29 | Stop reason: HOSPADM

## 2025-01-29 RX ORDER — TRAMADOL HYDROCHLORIDE 50 MG/1
50 TABLET ORAL EVERY 6 HOURS PRN
Status: DISCONTINUED | OUTPATIENT
Start: 2025-01-29 | End: 2025-01-29 | Stop reason: HOSPADM

## 2025-01-29 RX ORDER — POLYETHYLENE GLYCOL 3350 17 G/17G
17 POWDER, FOR SOLUTION ORAL DAILY
COMMUNITY
Start: 2025-01-29

## 2025-01-29 RX ORDER — TRAMADOL HYDROCHLORIDE 50 MG/1
50 TABLET ORAL EVERY 6 HOURS PRN
Qty: 28 TABLET | Refills: 0 | Status: SHIPPED | OUTPATIENT
Start: 2025-01-29 | End: 2025-02-05

## 2025-01-29 RX ORDER — ACETAMINOPHEN 325 MG/1
650 TABLET ORAL EVERY 6 HOURS
COMMUNITY
Start: 2025-01-29

## 2025-01-29 RX ADMIN — KETOROLAC TROMETHAMINE 15 MG: 15 INJECTION, SOLUTION INTRAMUSCULAR; INTRAVENOUS at 09:12

## 2025-01-29 RX ADMIN — ALBUTEROL SULFATE 2 PUFF: 90 AEROSOL, METERED RESPIRATORY (INHALATION) at 12:09

## 2025-01-29 RX ADMIN — HYDROCORTISONE 20 MG: 20 TABLET ORAL at 09:12

## 2025-01-29 RX ADMIN — ACETAMINOPHEN 650 MG: 325 TABLET ORAL at 05:43

## 2025-01-29 RX ADMIN — ACETAMINOPHEN 650 MG: 325 TABLET ORAL at 16:46

## 2025-01-29 RX ADMIN — KETOROLAC TROMETHAMINE 15 MG: 15 INJECTION, SOLUTION INTRAMUSCULAR; INTRAVENOUS at 01:00

## 2025-01-29 RX ADMIN — POLYETHYLENE GLYCOL 3350 17 G: 17 POWDER, FOR SOLUTION ORAL at 09:12

## 2025-01-29 RX ADMIN — ACETAMINOPHEN 650 MG: 325 TABLET ORAL at 10:56

## 2025-01-29 ASSESSMENT — COGNITIVE AND FUNCTIONAL STATUS - GENERAL
CLIMB 3 TO 5 STEPS WITH RAILING: A LITTLE
MOBILITY SCORE: 24
DRESSING REGULAR LOWER BODY CLOTHING: A LITTLE
STANDING UP FROM CHAIR USING ARMS: A LITTLE
MOVING TO AND FROM BED TO CHAIR: A LITTLE
WALKING IN HOSPITAL ROOM: A LITTLE
DAILY ACTIVITIY SCORE: 20
MOBILITY SCORE: 18
TOILETING: A LITTLE
HELP NEEDED FOR BATHING: A LITTLE
MOVING FROM LYING ON BACK TO SITTING ON SIDE OF FLAT BED WITH BEDRAILS: A LITTLE
DRESSING REGULAR UPPER BODY CLOTHING: A LITTLE
TURNING FROM BACK TO SIDE WHILE IN FLAT BAD: A LITTLE

## 2025-01-29 ASSESSMENT — PAIN SCALES - GENERAL
PAINLEVEL_OUTOF10: 0 - NO PAIN

## 2025-01-29 ASSESSMENT — PAIN - FUNCTIONAL ASSESSMENT
PAIN_FUNCTIONAL_ASSESSMENT: 0-10

## 2025-01-29 ASSESSMENT — ACTIVITIES OF DAILY LIVING (ADL)
LACK_OF_TRANSPORTATION: NO
ADL_ASSISTANCE: INDEPENDENT

## 2025-01-29 NOTE — CARE PLAN
Problem: Pain  Goal: Takes deep breaths with improved pain control throughout the shift  Outcome: Progressing  Goal: Turns in bed with improved pain control throughout the shift  Outcome: Progressing  Goal: Walks with improved pain control throughout the shift  Outcome: Progressing  Goal: Performs ADL's with improved pain control throughout shift  Outcome: Progressing  Goal: Free from opioid side effects throughout the shift  Outcome: Progressing  Goal: Free from acute confusion related to pain meds throughout the shift  Outcome: Progressing     Problem: Fall/Injury  Goal: Not fall by end of shift  Outcome: Progressing  Goal: Be free from injury by end of the shift  Outcome: Progressing  Goal: Verbalize understanding of personal risk factors for fall in the hospital  Outcome: Progressing  Goal: Verbalize understanding of risk factor reduction measures to prevent injury from fall in the home  Outcome: Progressing  Goal: Pace activities to prevent fatigue by end of the shift  Outcome: Progressing     Problem: Skin  Goal: Decreased wound size/increased tissue granulation at next dressing change  Outcome: Progressing  Goal: Participates in plan/prevention/treatment measures  Outcome: Progressing  Goal: Prevent/manage excess moisture  Outcome: Progressing  Goal: Prevent/minimize sheer/friction injuries  Outcome: Progressing  Goal: Promote/optimize nutrition  Outcome: Progressing  Goal: Promote skin healing  Outcome: Progressing

## 2025-01-29 NOTE — SIGNIFICANT EVENT
"Surgical Oncology Clinical Update    19:20, 01/28/2025  Post-Op Check  S: Patient seen at bedside s/p laparoscopic right adrenalectomy for post-operative evaluation. Denies f/c, n/v, SOB, CP. Pain is adequately controlled.     O:  /80 (BP Location: Left arm, Patient Position: Lying)   Pulse 55   Temp 36.8 °C (98.3 °F) (Temporal)   Resp 16   Ht 1.727 m (5' 8\")   Wt 95.5 kg (210 lb 8.6 oz)   SpO2 93%   BMI 32.01 kg/m²      - General: Sitting in bed in NAD  - CV: Bradycardic, appears well-perfused  - Pulm: Normal respiratory effort on 4 L NC, home CPAP at bedside  - Abd: Soft, mildly distended, appropriately tender; 4 trochar sites well-approximated with Dermabond in place    A/P: Patient is doing well post-operatively. No changes in management at this time. PM cortisol level pending.    20:50, 01/28/2025  PM cortisol level is 6.2. Discussed with Dr. Ervin, will start hydrocortisone 20 mg PO BID with first dose now. Cortisol level will be re-checked with AM labs.    Jyothi Lau MD  PGY-1 General Surgery  Surgical Oncology - [facility] Service [id number]    "

## 2025-01-29 NOTE — CARE PLAN
The patient's goals for the shift include      The clinical goals for the shift include pt will remain HDS    Problem: Pain  Goal: Takes deep breaths with improved pain control throughout the shift  Outcome: Progressing  Goal: Turns in bed with improved pain control throughout the shift  Outcome: Progressing  Goal: Walks with improved pain control throughout the shift  Outcome: Progressing  Goal: Performs ADL's with improved pain control throughout shift  Outcome: Progressing  Goal: Free from opioid side effects throughout the shift  Outcome: Progressing  Goal: Free from acute confusion related to pain meds throughout the shift  Outcome: Progressing     Problem: Fall/Injury  Goal: Not fall by end of shift  Outcome: Progressing  Goal: Be free from injury by end of the shift  Outcome: Progressing  Goal: Verbalize understanding of personal risk factors for fall in the hospital  Outcome: Progressing  Goal: Verbalize understanding of risk factor reduction measures to prevent injury from fall in the home  Outcome: Progressing  Goal: Pace activities to prevent fatigue by end of the shift  Outcome: Progressing     Problem: Skin  Goal: Decreased wound size/increased tissue granulation at next dressing change  Outcome: Progressing  Goal: Participates in plan/prevention/treatment measures  Outcome: Progressing  Goal: Prevent/manage excess moisture  Outcome: Progressing  Goal: Prevent/minimize sheer/friction injuries  Outcome: Progressing  Goal: Promote/optimize nutrition  Outcome: Progressing  Goal: Promote skin healing  Outcome: Progressing

## 2025-01-29 NOTE — PROGRESS NOTES
[facility]  ACUTE CARE SURGERY - PROGRESS NOTE    Patient Name: Noemí Reece  MRN: [id number]  Admit Date: 128  : 1955  AGE: 70 y.o.   GENDER: female  ==============================================================================  TODAY'S ASSESSMENT AND PLAN OF CARE:  69 y/o F w/ PMHx arthritis who p/f laparoscopic right adrenalectomy due to a mass noted on lung cancer CT chest. She is progressing well since surgery and has no major concerns. Cortisol levels have been appropriate since surgery with addition of PO hydrocortisone. Anticipate patient will be able to normalize today.     Plan:  - Pain control with tylenol, oxycodone, tramadol, toradol  - IS 10x/hr, Q4h vitals  - Continue home Breo Ellipta and Albuterol inhalers  - BP control with PRN hydralazine if necessary.   - Regular diet today  - DC kellogg catheter, due to void at 3pm  - DVT ppx with lovenox    Dispo: RNF, possible PM DC    Patient seen with senior resident, Dr. Mckeon; discussed with attending, Dr. Arcadio Fishman MD  [facility] [id number]      ==============================================================================  CHIEF COMPLAINT / EVENTS LAST 24HRS / HPI:  NAEO    MEDICAL HISTORY / ROS:   Admission history and ROS reviewed. Pertinent changes as follows:  None    PHYSICAL EXAM:  Heart Rate:  [55-81]   Temp:  [36.1 °C (97 °F)-37.7 °C (99.8 °F)]   Resp:  [10-18]   BP: (114-160)/(55-80)   SpO2:  [91 %-95 %]   Neurological: Awake, alert, conversive  Respiratory/Thorax: even, unlabored  HEENT: Incision well approximated with steri-strips  Genitourinary: voiding via kellogg catheter  Gastrointestinal: soft, NT, ND.   Skin: warm, dry  Musculoskeletal: LAMAR  Eyes: non-icteric  Extremities: no edema   Psychological: appropriate mood/affect      IMAGING SUMMARY:  (summary of new imaging findings, not a copy of dictation)  No new imaging    LABS:  Results from last 7 days   Lab Units 25  2827  01/24/25  1304   WBC AUTO x10*3/uL 11.4* 9.5   HEMOGLOBIN g/dL 12.5 14.6   HEMATOCRIT % 37.1 46.1*   PLATELETS AUTO x10*3/uL 174 202   NEUTROS PCT AUTO % 78.6  --    LYMPHS PCT AUTO % 13.7  --    MONOS PCT AUTO % 7.0  --    EOS PCT AUTO % 0.1  --      Results from last 7 days   Lab Units 01/24/25  1304   APTT seconds 30   INR  1.0     Results from last 7 days   Lab Units 01/29/25  0520 01/24/25  1304   SODIUM mmol/L 139 139   POTASSIUM mmol/L 4.0 3.6   CHLORIDE mmol/L 103 106   CO2 mmol/L 26 27   BUN mg/dL 19 19   CREATININE mg/dL 0.98 0.86   CALCIUM mg/dL 8.9 9.0   PROTEIN TOTAL g/dL  --  6.8   BILIRUBIN TOTAL mg/dL  --  0.4   ALK PHOS U/L  --  61   ALT U/L  --  19   AST U/L  --  16   GLUCOSE mg/dL 124* 81     Results from last 7 days   Lab Units 01/24/25  1304   BILIRUBIN TOTAL mg/dL 0.4           I have reviewed all medications, laboratory results, and imaging pertinent for today's encounter.

## 2025-01-29 NOTE — PROGRESS NOTES
01/29/25 1200   Discharge Planning   Living Arrangements Spouse/significant other   Support Systems Spouse/significant other;Children;Family members;Friends/neighbors   Type of Residence Private residence   Number of Stairs to Enter Residence 4   Number of Stairs Within Residence 13   Do you have animals or pets at home? Yes   Type of Animals or Pets Dog   Home or Post Acute Services None   Expected Discharge Disposition Home   Financial Resource Strain   How hard is it for you to pay for the very basics like food, housing, medical care, and heating? Not hard   Housing Stability   In the last 12 months, was there a time when you were not able to pay the mortgage or rent on time? N   In the past 12 months, how many times have you moved where you were living? 0   At any time in the past 12 months, were you homeless or living in a shelter (including now)? N   Transportation Needs   In the past 12 months, has lack of transportation kept you from medical appointments or from getting medications? no   In the past 12 months, has lack of transportation kept you from meetings, work, or from getting things needed for daily living? No     Patient is  s/p laparoscopic right adrenalectomy.    Per the medical team, this patient has no anticipated discharge needs; full assessment deferred at this time. Care transitions will continue to follow for any home going needs that arise. Marilee Villavicencio RN  ADOD 1/30 Anticipate HNN

## 2025-01-29 NOTE — PROGRESS NOTES
Physical Therapy    Physical Therapy Evaluation    Patient Name: Noemí Reece  MRN: [id number]  Today's Date: 1/29/2025   Room: [address] [place] [address]  Time Calculation  Start Time: 1129  Stop Time: 1158  Time Calculation (min): 29 min    Assessment/Plan   PT Assessment  Rehab Prognosis: Excellent  Barriers to Discharge Home: No anticipated barriers  Evaluation/Treatment Tolerance: Patient tolerated treatment well  Medical Staff Made Aware: Yes  Strengths: Attitude of self  Barriers to Participation: Comorbidities  End of Session Communication: Bedside nurse  Assessment Comment: 70 year old female s/p laparoscopic right adrenalectomy on 1/28/25. Pt completing all functional mobility grossly independently with no AD. At this time, no acute PT needs identified. Recommend home with NN.  End of Session Patient Position: Bed, 3 rail up, Alarm off, not on at start of session (sitting EOB, respiratory in room)  IP OR SWING BED PT PLAN  Inpatient or Swing Bed: Inpatient  PT Plan  PT Plan: PT Eval only  PT Eval Only Reason: No acute PT needs identified  PT Frequency: PT eval only  PT Discharge Recommendations: No further acute PT, No PT needed after discharge  PT Recommended Transfer Status: Independent  PT - OK to Discharge: Yes (PT eval complete and DC rec made)    Subjective   General Visit Information:  Reason for Referral: 70 year old female s/p laparoscopic right adrenalectomy on 1/28/25.  Past Medical History Relevant to Rehab: HTN  Prior to Session Communication: Bedside nurse  Patient Position Received: Bed, 3 rail up, Alarm off, not on at start of session  General Comment: Pt supine in bed upon entry to room. Pt pleasant, cooperative and willing to work with PT.   Home Living:  Home Living  Type of Home: House  Lives With: Spouse  Home Adaptive Equipment: Walker rolling or standard, Wheelchair-manual (shower chair-owns equipment but does not currently use)  Home Layout: One level, Laundry in basement  Home Access: Stairs to  "enter with rails  Entrance Stairs-Number of Steps: 4  Bathroom Shower/Tub: Tub/shower unit  Bathroom Equipment: Grab bars in shower  Prior Level of Function:  Prior Function Per Pt/Caregiver Report  Level of [place]: Independent with ADLs and functional transfers, Independent with homemaking with ambulation  ADL Assistance: Independent  Homemaking Assistance: Independent  Ambulatory Assistance: Independent  Vocational: Retired  Prior Function Comments: pt states she is independent at baseline, states that she had 1 \"fall;\" over the last 6 months but it was when a stack of drywall fell into her.  Precautions:     Vital Signs:  Spo2 92% sitting EOB; 87 when ambulating, returning to low 90s with standing rest break. RN aware.     Objective     Pain:  Pain Assessment  Pain Assessment: 0-10  0-10 (Numeric) Pain Score: 0 - No pain  Cognition:  Cognition  Overall Cognitive Status: Within Functional Limits  Orientation Level: Oriented X4    Extremity/Trunk Assessments:  Strength:    RLE   RLE : Within Functional Limits  LLE   LLE : Within Functional Limits    General Assessments:    Activity Tolerance  Endurance: Endurance does not limit participation in activity  Sensation  Light Touch: No apparent deficits     Static Sitting Balance  Static Sitting-Level of Assistance: Independent  Dynamic Sitting Balance  Dynamic Sitting-Level of Assistance: Independent  Static Standing Balance  Static Standing-Level of Assistance: Independent  Dynamic Standing Balance  Dynamic Standing-Level of Assistance: Independent    Functional Assessments:  Bed Mobility  Bed Mobility: Yes  Bed Mobility 1  Bed Mobility 1: Supine to sitting  Level of Assistance 1: Modified independent  Bed Mobility Comments 1: HOB partially elevated  Transfers  Transfer: Yes  Transfer 1  Technique 1: Sit to stand, Stand to sit  Transfer Level of Assistance 1: Independent  Ambulation/Gait Training  Ambulation/Gait Training Performed: Yes  Ambulation/Gait Training " 1  Surface 1: Level tile  Device 1: No device  Assistance 1: Independent  Comments/Distance (ft) 1: 250ft    Outcome Measures:  [facility] Basic Mobility  Turning from your back to your side while in a flat bed without using bedrails: None  Moving from lying on your back to sitting on the side of a flat bed without using bedrails: None  Moving to and from bed to chair (including a wheelchair): None  Standing up from a chair using your arms (e.g. wheelchair or bedside chair): None  To walk in hospital room: None  Climbing 3-5 steps with railing: None  Basic Mobility - Total Score: 24    Education Documentation  Mobility Training, taught by Ceci Sanders, PT at 1/29/2025  2:23 PM.  Learner: Patient  Readiness: Acceptance  Method: Explanation  Response: Verbalizes Understanding  Comment: importance of functional mobility    Education Comments  No comments found.      01/29/25 at 2:23 PM   Ceci Sanders, PT   Rehab Office: [phone]

## 2025-01-30 NOTE — NURSING NOTE
Noemí Reece discharged at 7:03 PM  and 01/29/25   Patient discharged home via private car   Discharge education and teaching completed with Noemí Reece no further questions at this time AVS provided___.  RN signature: Ryann

## 2025-02-05 LAB
LABORATORY COMMENT REPORT: NORMAL
PATH REPORT.COMMENTS IMP SPEC: NORMAL
PATH REPORT.FINAL DX SPEC: NORMAL
PATH REPORT.GROSS SPEC: NORMAL
PATH REPORT.RELEVANT HX SPEC: NORMAL
PATH REPORT.TOTAL CANCER: NORMAL

## 2025-02-11 NOTE — DOCUMENTATION CLARIFICATION NOTE
"    PATIENT:               MARYLU COLE  ACCT #:                  [id number]  MRN:                       [id number]  :                       1955  ADMIT DATE:       2025 7:52 AM  DISCH DATE:        2025 6:50 PM  RESPONDING PROVIDER #:        [id number]          PROVIDER RESPONSE TEXT:    The 25 pathology findings revealed benign adenoma of the right adrenal gland with hemorrhage and atrophy; without Cushing's syndrome    CDI QUERY TEXT:    Clarification    Instruction:    Based on your assessment of the patient and the clinical information, please provide the requested documentation by clicking on the appropriate radio button and enter any additional information if prompted.    Question: Based on your assessment of the patient and the pathology findings, can the 25 pathology findings be confirmed by providing the requested documentation to include if specimen is benign or malignant, primary or secondary and any metastatic sites.  Please include diagnosis of disease    When answering this query, please exercise your independent professional judgment. The fact that a question is being asked, does not imply that any particular answer is desired or expected.    The patient's clinical indicators include:  Clinical Information:  25 Surgical/Oncology Progress Note:  \"71 y/o F w/ PMHx arthritis who p/f laparoscopic right adrenalectomy due to a mass noted on lung cancer CT chest. She is progressing well since surgery and has no major concerns. 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 It has extensive central hemorrhage with granulation tissue and a focal " "perivascular chronic inflammatory infiltrate; acinar architecture is maintained throughout except in the areas of hemorrhage. Mitoses are scant and no atypical mitoses are seen. There is no evidence of invasive behavior. The nontumorous cortex is markedly atrophic with almost complete loss of the isaiah reticularis, consistent with glucocorticoid secretion by the tumor.\"    Treatment: Surgery    Risk Factors: Adrenal mass; tobacco/alcohol/drug use not on file  Options provided:  -- The 1/28/25 pathology findings revealed benign adenoma of the right adrenal gland with hemorrhage and atrophy; without Cushing's syndrome, Please specify additional information below  -- The 1/28/25 pathology findings revealed benign adenoma of the right adrenal gland with hemorrhage and atrophy; with Cushing's syndrome  -- Other - I will add my own diagnosis  -- Refer to Clinical Documentation Reviewer    Query created by: Roxanne Blakely on 2/11/2025 10:07 AM      Electronically signed by:  SANTI STOCKTON 2/11/2025 11:14 AM          "

## 2025-02-13 ENCOUNTER — OFFICE VISIT (OUTPATIENT)
Dept: SURGERY | Facility: CLINIC | Age: 70
End: 2025-02-13
Payer: MEDICARE

## 2025-02-13 VITALS
DIASTOLIC BLOOD PRESSURE: 81 MMHG | HEART RATE: 90 BPM | WEIGHT: 213 LBS | SYSTOLIC BLOOD PRESSURE: 148 MMHG | BODY MASS INDEX: 32.39 KG/M2

## 2025-02-13 DIAGNOSIS — E27.8 ADRENAL MASS (MULTI): Primary | ICD-10-CM

## 2025-02-13 PROCEDURE — 1160F RVW MEDS BY RX/DR IN RCRD: CPT | Performed by: SURGERY

## 2025-02-13 PROCEDURE — 99211 OFF/OP EST MAY X REQ PHY/QHP: CPT | Performed by: SURGERY

## 2025-02-13 PROCEDURE — 1111F DSCHRG MED/CURRENT MED MERGE: CPT | Performed by: SURGERY

## 2025-02-13 PROCEDURE — 1036F TOBACCO NON-USER: CPT | Performed by: SURGERY

## 2025-02-13 PROCEDURE — 3079F DIAST BP 80-89 MM HG: CPT | Performed by: SURGERY

## 2025-02-13 PROCEDURE — 1159F MED LIST DOCD IN RCRD: CPT | Performed by: SURGERY

## 2025-02-13 PROCEDURE — 3077F SYST BP >= 140 MM HG: CPT | Performed by: SURGERY

## 2025-02-13 NOTE — PROGRESS NOTES
Subjective   Patient ID: Noemí Reece is a 70 y.o. female who presents for postoperative visit after right sided laparoscopic adrenalectomy for Cushing's.    HPI I saw Mrs. Reece back in surgery clinic today.  She is now just a little over 2 weeks status post right laparoscopic adrenalectomy for her Cushing's.  At the time of her operation and we removed her right adrenal gland laparoscopically.  Final pathology showed a 6 cm benign adenoma.    Within 6 hours of surgery her cortisol levels had dropped from 25 down to less than 6.  Therefore we expected that she was going to become hypocortisolemic which could result in addisonian crisis.  Therefore we started her on hydrocortisone supplementation 20 mg twice a day.  We monitored it for her in the hospital for about 48 hours and her cortisol levels were stable.  Therefore she was discharged home.  She returns for her follow-up with us today.    She did follow-up with her medical endocrinologist, Dr. Wheeler to whom and began I have discharged her on hydrocortisone 20 mg twice daily.  She is now on 20 mg in the morning and 10 mg at night wean her cortisol supplements..  She said that her endocrinologist will call her again in a week or 2 and continue decreasing and monitoring her cortisol levels.    She denies any significant abdominal pain.  She never required any narcotic pain medication.  Incisions of healed well.    Review of Systems    Objective   Physical Exam  Vitals reviewed.   Constitutional:       Appearance: Normal appearance.   Abdominal:      General: Abdomen is flat.      Palpations: Abdomen is soft.      Tenderness: There is no abdominal tenderness.      Hernia: No hernia is present.      Comments: Her incisions are all well-healed.  No hernias.   Neurological:      Mental Status: She is alert.         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    Comment    The adrenal cortical tumor is a clear cell neoplasm with only very focal compact cell morphology. It has extensive central hemorrhage with granulation tissue and a focal perivascular chronic inflammatory infiltrate; acinar architecture is maintained throughout except in the areas of hemorrhage. Mitoses are scant and no atypical mitoses are seen. There is no evidence of invasive behavior. The nontumorous cortex is markedly atrophic with almost complete loss of the isaiah reticularis, consistent with glucocorticoid secretion by the tumor.   Clinical History    Right adrenal mass (Multi) [E27.8]   Gross Description    A: Received in formalin, labeled with the patient´s name and hospital number and \"ADREN RES R\", is an unoriented adrenal gland with surrounding fibroadipose tissue. The specimen weighs 89.65 g and measures 7.0 x 6.5 x 3.8 cm.  Following removal of the fibroadipose tissue, the specimen weighs 86.25 g, and measures 6.0 x 6.2 x 3.7 cm.           Assessment/Plan    Mrs. Reece did extremely well with a right laparoscopic adrenalectomy for her 6 cm right sided adrenal mass.  This was causing subclinical Cushing's.  Postoperatively she did show signs of adrenal suppression and was started on hydrocortisone based on blood testing.    She is now being weaned on her hydrocortisone supplements by her endocrinologist.    She has had no postop issues.  Incisions are healing well.  No pain.    Plan    1.  She can see me in 6 months for routine long-term " follow-up.    2.  She should keep all scheduled appointments and laboratory testing with her endocrinologist.         Cyrus Ervin MD 02/13/25 12:58 PM

## 2025-06-23 PROCEDURE — 0753T DGTZ GLS MCRSCP SLD LEVEL IV: CPT

## 2025-06-23 PROCEDURE — 88305 TISSUE EXAM BY PATHOLOGIST: CPT | Performed by: PATHOLOGY

## 2025-06-23 PROCEDURE — 88305 TISSUE EXAM BY PATHOLOGIST: CPT

## 2025-06-25 ENCOUNTER — LAB REQUISITION (OUTPATIENT)
Dept: LAB | Facility: HOSPITAL | Age: 70
End: 2025-06-25
Payer: MEDICARE

## 2025-06-25 DIAGNOSIS — Z86.0100 PERSONAL HISTORY OF COLON POLYPS, UNSPECIFIED: ICD-10-CM

## 2025-07-02 LAB
LABORATORY COMMENT REPORT: NORMAL
PATH REPORT.FINAL DX SPEC: NORMAL
PATH REPORT.GROSS SPEC: NORMAL
PATH REPORT.RELEVANT HX SPEC: NORMAL
PATH REPORT.TOTAL CANCER: NORMAL

## 2025-08-12 LAB
ALBUMIN SERPL-MCNC: 4.4 G/DL (ref 3.6–5.1)
ALP SERPL-CCNC: 61 U/L (ref 37–153)
ALT SERPL-CCNC: 24 U/L (ref 6–29)
ANION GAP SERPL CALCULATED.4IONS-SCNC: 9 MMOL/L (CALC) (ref 7–17)
APTT PPP: 26 SEC (ref 23–32)
AST SERPL-CCNC: 20 U/L (ref 10–35)
BILIRUB SERPL-MCNC: 0.4 MG/DL (ref 0.2–1.2)
BUN SERPL-MCNC: 17 MG/DL (ref 7–25)
CALCIUM SERPL-MCNC: 9.5 MG/DL (ref 8.6–10.4)
CHLORIDE SERPL-SCNC: 107 MMOL/L (ref 98–110)
CO2 SERPL-SCNC: 26 MMOL/L (ref 20–32)
CREAT SERPL-MCNC: 0.92 MG/DL (ref 0.6–1)
EGFRCR SERPLBLD CKD-EPI 2021: 67 ML/MIN/1.73M2
ERYTHROCYTE [DISTWIDTH] IN BLOOD BY AUTOMATED COUNT: 13 % (ref 11–15)
GLUCOSE SERPL-MCNC: 106 MG/DL (ref 65–99)
HCT VFR BLD AUTO: 47.3 % (ref 35–45)
HGB BLD-MCNC: 15 G/DL (ref 11.7–15.5)
INR PPP: 1
MCH RBC QN AUTO: 29.9 PG (ref 27–33)
MCHC RBC AUTO-ENTMCNC: 31.7 G/DL (ref 32–36)
MCV RBC AUTO: 94.2 FL (ref 80–100)
PLATELET # BLD AUTO: 212 THOUSAND/UL (ref 140–400)
PMV BLD REES-ECKER: 9.4 FL (ref 7.5–12.5)
POTASSIUM SERPL-SCNC: 4.1 MMOL/L (ref 3.5–5.3)
PROT SERPL-MCNC: 6.9 G/DL (ref 6.1–8.1)
PROTHROMBIN TIME: 10.5 SEC (ref 9–11.5)
RBC # BLD AUTO: 5.02 MILLION/UL (ref 3.8–5.1)
SODIUM SERPL-SCNC: 142 MMOL/L (ref 135–146)
WBC # BLD AUTO: 7 THOUSAND/UL (ref 3.8–10.8)

## 2025-08-14 ENCOUNTER — OFFICE VISIT (OUTPATIENT)
Dept: SURGERY | Facility: CLINIC | Age: 70
End: 2025-08-14
Payer: MEDICARE

## 2025-08-14 VITALS
WEIGHT: 218 LBS | BODY MASS INDEX: 33.15 KG/M2 | HEART RATE: 67 BPM | SYSTOLIC BLOOD PRESSURE: 118 MMHG | DIASTOLIC BLOOD PRESSURE: 73 MMHG

## 2025-08-14 DIAGNOSIS — E27.8 ADRENAL MASS (MULTI): Primary | ICD-10-CM

## 2025-08-14 PROCEDURE — 99214 OFFICE O/P EST MOD 30 MIN: CPT | Performed by: SURGERY

## 2025-08-14 PROCEDURE — 1159F MED LIST DOCD IN RCRD: CPT | Performed by: SURGERY

## 2025-08-14 PROCEDURE — 3078F DIAST BP <80 MM HG: CPT | Performed by: SURGERY

## 2025-08-14 PROCEDURE — 3074F SYST BP LT 130 MM HG: CPT | Performed by: SURGERY

## (undated) DEVICE — ARM PROTECTORS, FOAM

## (undated) DEVICE — SHEARS, HARMONIC 700, 5MM X 36 CM, CURVED

## (undated) DEVICE — PROTECTOR, NERVE, ULNAR, PINK

## (undated) DEVICE — TROCAR, BLADELESS, THREADED, 15MM

## (undated) DEVICE — DISSECTOR, ULTRASONIC, CORDLESS, 39CM, DISP

## (undated) DEVICE — RETRIEVAL SYSTEM, MONARCH, 10MM DISP ENDOSCOPIC

## (undated) DEVICE — STRIP, SKIN CLOSURE, STERI STRIP, REINFORCED, 0.5 X 4 IN

## (undated) DEVICE — DRESSING, NON-ADHERENT, TELFA, OUCHLESS, 3 X 8 IN, STERILE

## (undated) DEVICE — COVER, TABLE, UHC

## (undated) DEVICE — MANIFOLD, 4 PORT NEPTUNE STANDARD

## (undated) DEVICE — SHEARS, CURVED 5MM, ENDOPATH

## (undated) DEVICE — ENDO, PORT 5/12 VISIPORT W/FIXIATION CANNULA 176674PF

## (undated) DEVICE — APPLICATOR, CHLORAPREP, W/ORANGE TINT, 26ML

## (undated) DEVICE — COVER, CART, 45 X 27 X 48 IN, CLEAR

## (undated) DEVICE — APPLIER, 5MM ENDOSCOPIC, HEM-O-LOCK, W/15 ML CLIPS

## (undated) DEVICE — DRAPE, INSTRUMENT, W/POUCH, STERI DRAPE, 7 X 11 IN, DISPOSABLE, STERILE

## (undated) DEVICE — INSTRUMENT, DISSECTING, ENDOSCOPIC, PEANUT, 5 MM, STERILE

## (undated) DEVICE — GOWN, SURGICAL, SMARTGOWN, XLARGE, STERILE

## (undated) DEVICE — PUMP, STRYKERFLOW 2 & HANDPIECE W/10FT. IRRIGATION TUBING

## (undated) DEVICE — TUBING, SUCTION, CONNECTING, STERILE 0.25 X 120 IN., LF

## (undated) DEVICE — GLOVE, SURGICAL, PROTEXIS MICRO, 7.5, PF, LATEX

## (undated) DEVICE — TUBE SET, PNEUMOCLEAR, SMOKE EVACU, HIGH-FLOW

## (undated) DEVICE — TROCAR, KII OPTICAL BLADELESS 5MM Z THREAD 100MM LNGTH

## (undated) DEVICE — Device

## (undated) DEVICE — REST, HEAD, BAGEL, 7 IN

## (undated) DEVICE — DRAPE, SHEET, ENDOSCOPY, GENERAL, FENESTRATED, ARMBOARD COVER, 98 X 123.5 IN, DISPOSABLE, LF, STERILE

## (undated) DEVICE — SLEEVE, SURGICAL, 21.5 X 5.5 IN, LF, STERILE

## (undated) DEVICE — PAD, GROUNDING, ELECTROSURGICAL, W/9 FT CABLE, POLYHESIVE II, ADULT, LF

## (undated) DEVICE — TOWEL, SURGICAL, NEURO, O/R, 16 X 26, BLUE, STERILE

## (undated) DEVICE — ADHESIVE, SKIN, DERMABOND ADVANCED, 15CM, PEN-STYLE

## (undated) DEVICE — CATHETER TRAY, SURESTEP, 16FR, URINE METER W/STATLOCK

## (undated) DEVICE — TUBE, SALEM SUMP, 16 FR X 48IN, ENFIT

## (undated) DEVICE — GLOVE, PROTEXIS PI CLASSIC, SZ-7.5, PF, LF

## (undated) DEVICE — SUTURE, VICRYL, 0, 27 IN, UR-6, VIOLET

## (undated) DEVICE — SUTURE, VICRYL, 4-0, 18 IN, UNDYED BR PS-2